# Patient Record
Sex: MALE | Race: WHITE | URBAN - METROPOLITAN AREA
[De-identification: names, ages, dates, MRNs, and addresses within clinical notes are randomized per-mention and may not be internally consistent; named-entity substitution may affect disease eponyms.]

---

## 2017-03-10 ENCOUNTER — TELEPHONE (OUTPATIENT)
Dept: FAMILY MEDICINE | Facility: OTHER | Age: 62
End: 2017-03-10

## 2017-03-10 NOTE — TELEPHONE ENCOUNTER
Summary:    Patient is due/failing the following:   BMP, LDL, BP CHECK and COLONOSCOPY    Action needed:   Patient needs office visit for follow up., Patient needs fasting lab only appointment and schedule a colonoscopy     Type of outreach:    Phone, left message for patient to call back.     Questions for provider review:    None                                                                                                                                    Anastacia Barajas       Chart routed to Care Team .      Panel Management Review      Patient has the following on his problem list:     Hypertension   Last three blood pressure readings:  BP Readings from Last 3 Encounters:   10/19/16 (!) 160/98   03/11/16 130/90   03/04/16 (!) 152/110     Blood pressure: FAILED    HTN Guidelines:  Age 18-59 BP range:  Less than 140/90  Age 60-85 with Diabetes:  Less than 140/90  Age 60-85 without Diabetes:  less than 150/90      Composite cancer screening  Chart review shows that this patient is due/due soon for the following Colonoscopy

## 2017-03-10 NOTE — TELEPHONE ENCOUNTER
Patient returning call. Informed of the message below. Patient will call back to schedule appointments. Patient goes somewhere else for his labs and would like a call back wanting to know what labs need to be done. Call patient at phone#[phone]

## 2017-04-05 ENCOUNTER — TELEPHONE (OUTPATIENT)
Dept: FAMILY MEDICINE | Facility: OTHER | Age: 62
End: 2017-04-05

## 2017-04-06 NOTE — TELEPHONE ENCOUNTER
Left message for patient to call back. Please see EM message below and help schedule an OV to follow up for repeat BP/labwork. Last OV 10/2016.  Janice Contreras, CMA

## 2017-04-07 NOTE — TELEPHONE ENCOUNTER
LM for patient to return our call, please give below message.   Olga Lidia Bardales CMA ([facility])

## 2017-04-10 NOTE — TELEPHONE ENCOUNTER
Patient would like to get his labs done at a different location, what would you like him to get done.  [facility] has no insurance [facility]

## 2017-04-10 NOTE — TELEPHONE ENCOUNTER
Orders for lipids and BMP are , we can extend them or if you want to reorder so the results go to you instead of [facility]. Also lipids were done in October do you want that done again or just the BMP? Omaira Clark, CMA

## 2017-04-10 NOTE — TELEPHONE ENCOUNTER
Informed patient bmp would be due and he stated that lab cor put in orders and will get them drawn there and will fax us the results.

## 2017-04-14 ENCOUNTER — TRANSFERRED RECORDS (OUTPATIENT)
Dept: HEALTH INFORMATION MANAGEMENT | Facility: CLINIC | Age: 62
End: 2017-04-14

## 2017-04-14 LAB
ALT SERPL-CCNC: 29 U/L (ref 5–50)
AST SERPL-CCNC: 25 U/L (ref 9–50)
CHOLEST SERPL-MCNC: 221 MG/DL
CREAT SERPL-MCNC: 1.04 MG/DL (ref 0.8–1.4)
GFR SERPL CREATININE-BSD FRML MDRD: 89 ML/MIN/1.73M2
GLUCOSE SERPL-MCNC: 120 MG/DL (ref 70–99)
HBA1C MFR BLD: 6 % (ref 0–5.7)
HDLC SERPL-MCNC: 57 MG/DL
LDLC SERPL CALC-MCNC: 145 MG/DL (ref 40–130)
NONHDLC SERPL-MCNC: 164 MG/DL
POTASSIUM SERPL-SCNC: 5 MEQ/L (ref 3.5–5.4)
TRIGL SERPL-MCNC: 78 MG/DL (ref 30–150)

## 2017-06-06 ENCOUNTER — OFFICE VISIT (OUTPATIENT)
Dept: FAMILY MEDICINE | Facility: OTHER | Age: 62
End: 2017-06-06

## 2017-06-06 VITALS
BODY MASS INDEX: 41.17 KG/M2 | HEIGHT: 72 IN | RESPIRATION RATE: 18 BRPM | TEMPERATURE: 97.8 F | WEIGHT: 304 LBS | DIASTOLIC BLOOD PRESSURE: 90 MMHG | HEART RATE: 62 BPM | SYSTOLIC BLOOD PRESSURE: 170 MMHG

## 2017-06-06 DIAGNOSIS — I10 ESSENTIAL HYPERTENSION: ICD-10-CM

## 2017-06-06 DIAGNOSIS — I10 BENIGN ESSENTIAL HYPERTENSION: ICD-10-CM

## 2017-06-06 PROCEDURE — 99213 OFFICE O/P EST LOW 20 MIN: CPT | Performed by: FAMILY MEDICINE

## 2017-06-06 RX ORDER — HYDROCHLOROTHIAZIDE 12.5 MG/1
25 CAPSULE ORAL EVERY MORNING
Qty: 90 CAPSULE | Refills: 1 | Status: SHIPPED | OUTPATIENT
Start: 2017-06-06

## 2017-06-06 RX ORDER — LOSARTAN POTASSIUM 100 MG/1
100 TABLET ORAL DAILY
Qty: 90 TABLET | Refills: 1 | Status: SHIPPED | OUTPATIENT
Start: 2017-06-06

## 2017-06-06 ASSESSMENT — PAIN SCALES - GENERAL: PAINLEVEL: NO PAIN (0)

## 2017-06-06 NOTE — NURSING NOTE
"Chief Complaint   Patient presents with     Hypertension     Health Maintenance       Initial BP (!) 170/100 (BP Location: Right arm, Patient Position: Chair, Cuff Size: Adult Large)  Pulse 62  Temp 97.8  F (36.6  C) (Oral)  Resp 18  Ht 5' 11.5\" (1.816 m)  Wt (!) 304 lb (137.9 kg)  BMI 41.81 kg/m2 Estimated body mass index is 41.81 kg/(m^2) as calculated from the following:    Height as of this encounter: 5' 11.5\" (1.816 m).    Weight as of this encounter: 304 lb (137.9 kg).  Medication Reconciliation: complete    "

## 2017-06-06 NOTE — ASSESSMENT & PLAN NOTE
Reviewed labs from outside.  Has been out of meds for 3 days   Restart meds  Return to pharmacy for blood pressure recheck in 1 week  Encouraged weight loss

## 2017-06-06 NOTE — MR AVS SNAPSHOT
"              After Visit Summary   6/6/2017    Oscar Gonsalez    MRN: [id number]           Patient Information     Date Of Birth          1955        Visit Information        Provider Department      6/6/2017 3:40 PM Annita Macdonald MD [facility]        Today's Diagnoses     Benign essential hypertension        Essential hypertension           Follow-ups after your visit        Follow-up notes from your care team     Return in about 6 months (around 12/6/2017).      Who to contact     If you have questions or need follow up information about today's clinic visit or your schedule please contact [facility] directly at [phone].  Normal or non-critical lab and imaging results will be communicated to you by MyChart, letter or phone within 4 business days after the clinic has received the results. If you do not hear from us within 7 days, please contact the clinic through Triparazzihart or phone. If you have a critical or abnormal lab result, we will notify you by phone as soon as possible.  Submit refill requests through Vantageous or call your pharmacy and they will forward the refill request to us. Please allow 3 business days for your refill to be completed.          Additional Information About Your Visit        MyChart Information     Vantageous gives you secure access to your electronic health record. If you see a primary care provider, you can also send messages to your care team and make appointments. If you have questions, please call your primary care clinic.  If you do not have a primary care provider, please call [phone] and they will assist you.        Care EveryWhere ID     This is your Care EveryWhere ID. This could be used by other organizations to access your [facility] medical records  [id number]        Your Vitals Were     Pulse Temperature Respirations Height BMI (Body Mass Index)       62 97.8  F (36.6  C) (Oral) 18 5' 11.5\" (1.816 m) 41.81 kg/m2        Blood " Pressure from Last 3 Encounters:   06/06/17 (!) 170/100   10/19/16 (!) 160/98   03/11/16 130/90    Weight from Last 3 Encounters:   06/06/17 (!) 304 lb (137.9 kg)   10/19/16 288 lb (130.6 kg)   03/11/16 266 lb (120.7 kg)              Today, you had the following     No orders found for display         Today's Medication Changes          These changes are accurate as of: 6/6/17  4:23 PM.  If you have any questions, ask your nurse or doctor.               These medicines have changed or have updated prescriptions.        Dose/Directions    hydrochlorothiazide 12.5 MG capsule   Commonly known as:  MICROZIDE   This may have changed:    - how much to take  - when to take this   Used for:  Benign essential hypertension   Changed by:  Annita Macdonald MD        Dose:  25 mg   Take 2 capsules (25 mg) by mouth every morning   Quantity:  90 capsule   Refills:  1            Where to get your medicines      These medications were sent to [place] [address]     Phone:  [phone]     hydrochlorothiazide 12.5 MG capsule    losartan 100 MG tablet                Primary Care Provider Office Phone # Fax #    Annita Macdonald [phone]       [place] [id number]        Thank you!     Thank you for choosing [facility]  for your care. Our goal is always to provide you with excellent care. Hearing back from our patients is one way we can continue to improve our services. Please take a few minutes to complete the written survey that you may receive in the mail after your visit with us. Thank you!             Your Updated Medication List - Protect others around you: Learn how to safely use, store and throw away your medicines at www.disposemymeds.org.          This list is accurate as of: 6/6/17  4:23 PM.  Always use your most recent med list.                   Brand Name  Dispense Instructions for use    aspirin 81 MG tablet     90 tablet    Take 1 tablet (81 mg) by mouth daily       fish oil-omega-3 fatty acids 1000 MG capsule      3000mg daily (liquid formalation)       hydrochlorothiazide 12.5 MG capsule    MICROZIDE    90 capsule    Take 2 capsules (25 mg) by mouth every morning       losartan 100 MG tablet    COZAAR    90 tablet    Take 1 tablet (100 mg) by mouth daily       multivitamin per tablet      1 TABLET DAILY

## 2017-06-15 ENCOUNTER — TELEPHONE (OUTPATIENT)
Dept: FAMILY MEDICINE | Facility: OTHER | Age: 62
End: 2017-06-15

## 2017-06-15 NOTE — TELEPHONE ENCOUNTER
Summary:    Patient is due/failing the following:   BMP, BP CHECK and COLONOSCOPY    Action needed:   Patient needs non-fasting lab only appointment, Patient needs nurse only appointment. and schedule a colonoscopy or complete a FIT test    Type of outreach:    Phone, spoke to patient.  patient will stop in next week for BP check     Questions for provider review:    None                                                                                                                                    Anastacia Barajas       Chart routed to Care Team .        Panel Management Review      Patient has the following on his problem list:     Hypertension   Last three blood pressure readings:  BP Readings from Last 3 Encounters:   06/06/17 170/90   10/19/16 (!) 160/98   03/11/16 130/90     Blood pressure: FAILED    HTN Guidelines:  Age 18-59 BP range:  Less than 140/90  Age 60-85 with Diabetes:  Less than 140/90  Age 60-85 without Diabetes:  less than 150/90      Composite cancer screening  Chart review shows that this patient is due/due soon for the following Colonoscopy

## 2017-06-21 ENCOUNTER — ALLIED HEALTH/NURSE VISIT (OUTPATIENT)
Dept: FAMILY MEDICINE | Facility: OTHER | Age: 62
End: 2017-06-21

## 2017-06-21 VITALS — HEART RATE: 72 BPM | SYSTOLIC BLOOD PRESSURE: 128 MMHG | DIASTOLIC BLOOD PRESSURE: 72 MMHG

## 2017-06-21 DIAGNOSIS — Z01.30 BLOOD PRESSURE CHECK: Primary | ICD-10-CM

## 2017-06-21 PROCEDURE — 99207 ZZC NO CHARGE NURSE ONLY: CPT

## 2017-06-21 NOTE — MR AVS SNAPSHOT
After Visit Summary   6/21/2017    Oscar Gonsalez    MRN: [id number]           Patient Information     Date Of Birth          1955        Visit Information        Provider Department      6/21/2017 2:30 PM TANESHA PLAZA TEAM B, [facility]        Today's Diagnoses     Blood pressure check    -  1       Follow-ups after your visit        Who to contact     If you have questions or need follow up information about today's clinic visit or your schedule please contact [facility] directly at [phone].  Normal or non-critical lab and imaging results will be communicated to you by Kleekhart, letter or phone within 4 business days after the clinic has received the results. If you do not hear from us within 7 days, please contact the clinic through Gear6t or phone. If you have a critical or abnormal lab result, we will notify you by phone as soon as possible.  Submit refill requests through Juntines or call your pharmacy and they will forward the refill request to us. Please allow 3 business days for your refill to be completed.          Additional Information About Your Visit        MyChart Information     Juntines gives you secure access to your electronic health record. If you see a primary care provider, you can also send messages to your care team and make appointments. If you have questions, please call your primary care clinic.  If you do not have a primary care provider, please call [phone] and they will assist you.        Care EveryWhere ID     This is your Care EveryWhere ID. This could be used by other organizations to access your [facility] medical records  [id number]        Your Vitals Were     Pulse                   72            Blood Pressure from Last 3 Encounters:   06/21/17 128/72   06/06/17 170/90   10/19/16 (!) 160/98    Weight from Last 3 Encounters:   06/06/17 (!) 304 lb (137.9 kg)   10/19/16 288 lb (130.6 kg)   03/11/16 266 lb (120.7 kg)               Today, you had the following     No orders found for display       Primary Care Provider Office Phone # Fax #    Annita Macdonald [phone]       [address] [address]        Equal Access to Services     SAM CASTILLO : Hadvandana blake Sonataleeali, waaxda luqadaha, qaybta kaalmada adeegyada, francisca gregg. So [facility] [phone].    ATENCIÓN: Si habla español, tiene a farmer disposición servicios gratuitos de asistencia lingüística. Llame al [phone].    We comply with applicable federal civil rights laws and [place] laws. We do not discriminate on the basis of race, color, national origin, age, disability sex, sexual orientation or gender identity.            Thank you!     Thank you for choosing [facility]  for your care. Our goal is always to provide you with excellent care. Hearing back from our patients is one way we can continue to improve our services. Please take a few minutes to complete the written survey that you may receive in the mail after your visit with us. Thank you!             Your Updated Medication List - Protect others around you: Learn how to safely use, store and throw away your medicines at www.disposemymeds.org.          This list is accurate as of: 6/21/17  2:42 PM.  Always use your most recent med list.                   Brand Name Dispense Instructions for use Diagnosis    aspirin 81 MG tablet     90 tablet    Take 1 tablet (81 mg) by mouth daily    CAD (coronary artery disease)       fish oil-omega-3 fatty acids 1000 MG capsule      3000mg daily (liquid formalation)        hydrochlorothiazide 12.5 MG capsule    MICROZIDE    90 capsule    Take 2 capsules (25 mg) by mouth every morning    Benign essential hypertension       losartan 100 MG tablet    COZAAR    90 tablet    Take 1 tablet (100 mg) by mouth daily    Benign essential hypertension       multivitamin per  tablet      1 TABLET DAILY

## 2017-06-21 NOTE — NURSING NOTE
Oscar Gonsalez is a 61 year old male who comes in today for a Blood Pressure check because of ongoing blood pressure monitoring.    *Document pulse and BP  *Use new set of vitals button for multiple readings.  *Use extended vitals for orthostatic    Vitals as recorded, a large cuff was used.    Patient is taking medication as prescribed  Patient is tolerating medications well.  Patient is monitoring Blood Pressure at home.  Average readings if yes are 135-140/84    Current complaints: none    Disposition: results routed to MD/AP and patient reminded to call as needed  Omaira Clark, CMA

## 2017-09-06 ENCOUNTER — TELEPHONE (OUTPATIENT)
Dept: FAMILY MEDICINE | Facility: OTHER | Age: 62
End: 2017-09-06

## 2017-09-06 DIAGNOSIS — E78.5 HYPERLIPIDEMIA LDL GOAL <100: Primary | ICD-10-CM

## 2017-09-06 NOTE — TELEPHONE ENCOUNTER
Summary:    Patient is due/failing the following:   BMP, COLONOSCOPY and LDL    Action needed:   Patient needs fasting lab only appointment and schedule a colonoscopy or complete a FIT test     Type of outreach:    None, routed to provider for review.    Questions for provider review:    Patient is on the IVD fail list due to LDL being >40 and not prescribed a STATIN. If patient is intentionally not prescribed a STATIN please update medication list with STATIN intentionally not prescribed.                                                                                                                                     Anastacai Barajas       Chart routed to Provider .        Panel Management Review      Patient has the following on his problem list:       IVD   ASA: Passed    Last LDL:    Lab Results   Component Value Date    CHOL 221 04/14/2017     Lab Results   Component Value Date    HDL 57 04/14/2017     Lab Results   Component Value Date     04/14/2017     Lab Results   Component Value Date    TRIG 78 04/14/2017        Lab Results   Component Value Date    CHOLHDLRATIO 3.7 06/25/2013        Is the patient on a Statin? NO   Is the patient on Aspirin? YES                  Medications     Salicylates    aspirin 81 MG tablet          Last three blood pressure readings:  BP Readings from Last 3 Encounters:   06/21/17 128/72   06/06/17 170/90   10/19/16 (!) 160/98        Tobacco History:     History   Smoking Status     Never Smoker   Smokeless Tobacco     Never Used           Composite cancer screening  Chart review shows that this patient is due/due soon for the following Colonoscopy

## 2018-01-11 ENCOUNTER — TELEPHONE (OUTPATIENT)
Dept: FAMILY MEDICINE | Facility: OTHER | Age: 63
End: 2018-01-11

## 2018-01-11 NOTE — TELEPHONE ENCOUNTER
1/11/2018    Patient does not have insurance and will not be doing Colonoscopy at the moment.          Outreach ,  Mauricio Botello

## 2018-09-10 ENCOUNTER — TRANSFERRED RECORDS (OUTPATIENT)
Dept: HEALTH INFORMATION MANAGEMENT | Facility: CLINIC | Age: 63
End: 2018-09-10

## 2019-10-29 ENCOUNTER — TRANSFERRED RECORDS (OUTPATIENT)
Dept: HEALTH INFORMATION MANAGEMENT | Facility: CLINIC | Age: 64
End: 2019-10-29

## 2019-11-06 ENCOUNTER — MEDICAL CORRESPONDENCE (OUTPATIENT)
Dept: HEALTH INFORMATION MANAGEMENT | Facility: CLINIC | Age: 64
End: 2019-11-06

## 2019-11-06 ENCOUNTER — TELEPHONE (OUTPATIENT)
Dept: FAMILY MEDICINE | Facility: OTHER | Age: 64
End: 2019-11-06

## 2019-11-06 NOTE — TELEPHONE ENCOUNTER
Reason for Call:  Form, our goal is to have forms completed with 72 hours, however, some forms may require a visit or additional information.    Type of letter, form or note:  medical    Who is the form from?: Juan Hearing (if other please explain)    Where did the form come from: form was faxed in    What clinic location was the form placed at?: [facility] - [phone]    Where the form was placed:  Box/Folder    What number is listed as a contact on the form?: [phone]       Additional comments: sign fax back    Call taken on 11/6/2019 at 2:01 PM by Deedee Whitten

## 2020-02-23 ENCOUNTER — HEALTH MAINTENANCE LETTER (OUTPATIENT)
Age: 65
End: 2020-02-23

## 2020-12-06 ENCOUNTER — HEALTH MAINTENANCE LETTER (OUTPATIENT)
Age: 65
End: 2020-12-06

## 2021-09-26 ENCOUNTER — HEALTH MAINTENANCE LETTER (OUTPATIENT)
Age: 66
End: 2021-09-26

## 2022-01-15 ENCOUNTER — HEALTH MAINTENANCE LETTER (OUTPATIENT)
Age: 67
End: 2022-01-15

## 2023-02-01 ENCOUNTER — OFFICE VISIT (OUTPATIENT)
Dept: INTERNAL MEDICINE CLINIC | Facility: CLINIC | Age: 68
End: 2023-02-01
Payer: MEDICARE

## 2023-02-01 VITALS
HEIGHT: 72 IN | DIASTOLIC BLOOD PRESSURE: 76 MMHG | BODY MASS INDEX: 41.55 KG/M2 | SYSTOLIC BLOOD PRESSURE: 130 MMHG | WEIGHT: 306.8 LBS

## 2023-02-01 DIAGNOSIS — Z78.9 STATIN INTOLERANCE: ICD-10-CM

## 2023-02-01 DIAGNOSIS — Z12.11 COLON CANCER SCREENING: ICD-10-CM

## 2023-02-01 DIAGNOSIS — Z12.5 PROSTATE CANCER SCREENING: ICD-10-CM

## 2023-02-01 DIAGNOSIS — E11.9 TYPE 2 DIABETES MELLITUS WITHOUT COMPLICATION, WITHOUT LONG-TERM CURRENT USE OF INSULIN (HCC): ICD-10-CM

## 2023-02-01 DIAGNOSIS — I25.10 CORONARY ARTERY DISEASE INVOLVING NATIVE CORONARY ARTERY OF NATIVE HEART WITHOUT ANGINA PECTORIS: Primary | ICD-10-CM

## 2023-02-01 DIAGNOSIS — E66.01 MORBID OBESITY (HCC): ICD-10-CM

## 2023-02-01 DIAGNOSIS — E11.69 HYPERLIPIDEMIA ASSOCIATED WITH TYPE 2 DIABETES MELLITUS (HCC): ICD-10-CM

## 2023-02-01 DIAGNOSIS — I10 HYPERTENSION, ESSENTIAL: ICD-10-CM

## 2023-02-01 DIAGNOSIS — E78.5 HYPERLIPIDEMIA ASSOCIATED WITH TYPE 2 DIABETES MELLITUS (HCC): ICD-10-CM

## 2023-02-01 DIAGNOSIS — E08.65 DIABETES MELLITUS DUE TO UNDERLYING CONDITION WITH HYPERGLYCEMIA, WITHOUT LONG-TERM CURRENT USE OF INSULIN (HCC): ICD-10-CM

## 2023-02-01 DIAGNOSIS — Z23 ENCOUNTER FOR IMMUNIZATION: ICD-10-CM

## 2023-02-01 LAB
ALBUMIN SERPL-MCNC: 3.7 G/DL (ref 3.2–4.6)
ALBUMIN/GLOB SERPL: 1 (ref 0.4–1.6)
ALP SERPL-CCNC: 76 U/L (ref 50–136)
ALT SERPL-CCNC: 36 U/L (ref 12–65)
ANION GAP SERPL CALC-SCNC: 7 MMOL/L (ref 2–11)
AST SERPL-CCNC: 16 U/L (ref 15–37)
BASOPHILS # BLD: 0.1 K/UL (ref 0–0.2)
BASOPHILS NFR BLD: 1 % (ref 0–2)
BILIRUB SERPL-MCNC: 0.4 MG/DL (ref 0.2–1.1)
BUN SERPL-MCNC: 28 MG/DL (ref 8–23)
CALCIUM SERPL-MCNC: 9 MG/DL (ref 8.3–10.4)
CHLORIDE SERPL-SCNC: 103 MMOL/L (ref 101–110)
CHOLEST SERPL-MCNC: 237 MG/DL
CO2 SERPL-SCNC: 28 MMOL/L (ref 21–32)
CREAT SERPL-MCNC: 1.6 MG/DL (ref 0.8–1.5)
DIFFERENTIAL METHOD BLD: NORMAL
EOSINOPHIL # BLD: 0.3 K/UL (ref 0–0.8)
EOSINOPHIL NFR BLD: 3 % (ref 0.5–7.8)
ERYTHROCYTE [DISTWIDTH] IN BLOOD BY AUTOMATED COUNT: 12.6 % (ref 11.9–14.6)
EST. AVERAGE GLUCOSE BLD GHB EST-MCNC: 220 MG/DL
GLOBULIN SER CALC-MCNC: 3.7 G/DL (ref 2.8–4.5)
GLUCOSE SERPL-MCNC: 267 MG/DL (ref 65–100)
HBA1C MFR BLD: 9.3 % (ref 4.8–5.6)
HCT VFR BLD AUTO: 46.1 % (ref 41.1–50.3)
HDLC SERPL-MCNC: 42 MG/DL (ref 40–60)
HDLC SERPL: 5.6
HGB BLD-MCNC: 15.2 G/DL (ref 13.6–17.2)
IMM GRANULOCYTES # BLD AUTO: 0 K/UL (ref 0–0.5)
IMM GRANULOCYTES NFR BLD AUTO: 0 % (ref 0–5)
LDLC SERPL CALC-MCNC: 157.2 MG/DL
LYMPHOCYTES # BLD: 2.1 K/UL (ref 0.5–4.6)
LYMPHOCYTES NFR BLD: 25 % (ref 13–44)
MCH RBC QN AUTO: 29.3 PG (ref 26.1–32.9)
MCHC RBC AUTO-ENTMCNC: 33 G/DL (ref 31.4–35)
MCV RBC AUTO: 88.8 FL (ref 82–102)
MONOCYTES # BLD: 0.8 K/UL (ref 0.1–1.3)
MONOCYTES NFR BLD: 9 % (ref 4–12)
NEUTS SEG # BLD: 5.4 K/UL (ref 1.7–8.2)
NEUTS SEG NFR BLD: 62 % (ref 43–78)
NRBC # BLD: 0 K/UL (ref 0–0.2)
PLATELET # BLD AUTO: 216 K/UL (ref 150–450)
PMV BLD AUTO: 10.6 FL (ref 9.4–12.3)
POTASSIUM SERPL-SCNC: 4.3 MMOL/L (ref 3.5–5.1)
PROT SERPL-MCNC: 7.4 G/DL (ref 6.3–8.2)
PSA SERPL-MCNC: 2.4 NG/ML
RBC # BLD AUTO: 5.19 M/UL (ref 4.23–5.6)
SODIUM SERPL-SCNC: 138 MMOL/L (ref 133–143)
TRIGL SERPL-MCNC: 189 MG/DL (ref 35–150)
TSH, 3RD GENERATION: 2.9 UIU/ML (ref 0.36–3.74)
VLDLC SERPL CALC-MCNC: 37.8 MG/DL (ref 6–23)
WBC # BLD AUTO: 8.7 K/UL (ref 4.3–11.1)

## 2023-02-01 PROCEDURE — 3078F DIAST BP <80 MM HG: CPT | Performed by: INTERNAL MEDICINE

## 2023-02-01 PROCEDURE — 3046F HEMOGLOBIN A1C LEVEL >9.0%: CPT | Performed by: INTERNAL MEDICINE

## 2023-02-01 PROCEDURE — G8417 CALC BMI ABV UP PARAM F/U: HCPCS | Performed by: INTERNAL MEDICINE

## 2023-02-01 PROCEDURE — 3075F SYST BP GE 130 - 139MM HG: CPT | Performed by: INTERNAL MEDICINE

## 2023-02-01 PROCEDURE — 1123F ACP DISCUSS/DSCN MKR DOCD: CPT | Performed by: INTERNAL MEDICINE

## 2023-02-01 PROCEDURE — G8484 FLU IMMUNIZE NO ADMIN: HCPCS | Performed by: INTERNAL MEDICINE

## 2023-02-01 PROCEDURE — 3017F COLORECTAL CA SCREEN DOC REV: CPT | Performed by: INTERNAL MEDICINE

## 2023-02-01 PROCEDURE — 2022F DILAT RTA XM EVC RTNOPTHY: CPT | Performed by: INTERNAL MEDICINE

## 2023-02-01 PROCEDURE — 99204 OFFICE O/P NEW MOD 45 MIN: CPT | Performed by: INTERNAL MEDICINE

## 2023-02-01 PROCEDURE — 1036F TOBACCO NON-USER: CPT | Performed by: INTERNAL MEDICINE

## 2023-02-01 PROCEDURE — G8428 CUR MEDS NOT DOCUMENT: HCPCS | Performed by: INTERNAL MEDICINE

## 2023-02-01 RX ORDER — CLOPIDOGREL BISULFATE 75 MG/1
TABLET ORAL
COMMUNITY
Start: 2023-01-09

## 2023-02-01 RX ORDER — NITROGLYCERIN 0.4 MG/1
TABLET SUBLINGUAL
COMMUNITY
Start: 2023-01-09

## 2023-02-01 RX ORDER — AMLODIPINE BESYLATE 10 MG/1
TABLET ORAL
COMMUNITY
Start: 2022-12-26

## 2023-02-01 RX ORDER — CARVEDILOL 3.12 MG/1
3.12 TABLET ORAL DAILY
COMMUNITY

## 2023-02-01 RX ORDER — EZETIMIBE 10 MG/1
10 TABLET ORAL NIGHTLY
COMMUNITY

## 2023-02-01 RX ORDER — TORSEMIDE 10 MG/1
10 TABLET ORAL DAILY
COMMUNITY

## 2023-02-01 ASSESSMENT — ENCOUNTER SYMPTOMS
STRIDOR: 0
EYE PAIN: 0
CHOKING: 0
RECTAL PAIN: 0
VOICE CHANGE: 0

## 2023-02-01 ASSESSMENT — PATIENT HEALTH QUESTIONNAIRE - PHQ9
SUM OF ALL RESPONSES TO PHQ QUESTIONS 1-9: 0
SUM OF ALL RESPONSES TO PHQ QUESTIONS 1-9: 0
2. FEELING DOWN, DEPRESSED OR HOPELESS: 0
SUM OF ALL RESPONSES TO PHQ QUESTIONS 1-9: 0
1. LITTLE INTEREST OR PLEASURE IN DOING THINGS: 0
SUM OF ALL RESPONSES TO PHQ9 QUESTIONS 1 & 2: 0
SUM OF ALL RESPONSES TO PHQ QUESTIONS 1-9: 0

## 2023-02-01 NOTE — PROGRESS NOTES
NEW PATIENT VISIT    Subjective:    Mr. Mc Soria is a 79 y.o., male,   Chief Complaint   Patient presents with    Establish Care    Referral - General     Cardiologist        HPI:    Mr. Mc Soria is a 79 y.o., male who presents today for a new patient appointment. The patient recently moved to [facility] from [place]. He cannot recall the names of his previous physicians and there are no old records in [address] [place] [address]. The patient has hypertension. The patient has been on an attempted low sodium diet and has been trying to exercise and maintain a healthy weight. The patient reports good compliance with the blood pressure medications. The patient has coronary artery disease. The patient has been attempting to follow a heart healthy diet and exercise. The patient denies chest pain, shortness of breath, dyspnea on exertion, or PND. The patient has diabetes mellitus. The patient denies any symptoms of hypo or hyperglycemia. The patient has been attempting to be compliant with an ADA diet and an exercise program.     The patient has hyperlipidemia. The patient has been following a low cholesterol diet. He has been statin intolerant. The following portions of the patient's history were reviewed and updated as appropriate:      Past Medical History:   Diagnosis Date    Coronary artery disease     S/P MI / stents.     Diabetes mellitus, type 2 ([address]     Hyperlipidemia associated with type 2 diabetes mellitus (HCC)     Hypertension, essential     Morbid obesity (HCC)     Statin intolerance        Past Surgical History:   Procedure Laterality Date    SHOULDER SURGERY Left     labral tear / rotator cuff       Family History   Problem Relation Age of Onset    Cervical Cancer Mother     Heart Attack Father        Social History     Socioeconomic History    Marital status:      Spouse name: Not on file    Number of children: 4    Years of education: Not on file    Highest education level: Not on file   Occupational History     Comment: self employed biofeedback   Tobacco Use    Smoking status: Never    Smokeless tobacco: Never   Substance and Sexual Activity    Alcohol use: Not Currently    Drug use: Never    Sexual activity: Not on file   Other Topics Concern    Not on file   Social History Narrative    Not on file     Social Determinants of Health     Financial Resource Strain: Not on file   Food Insecurity: Not on file   Transportation Needs: Not on file   Physical Activity: Not on file   Stress: Not on file   Social Connections: Not on file   Intimate Partner Violence: Not on file   Housing Stability: Not on file       Current Outpatient Medications   Medication Sig Dispense Refill    amLODIPine (NORVASC) 10 MG tablet TAKE 1 TABLET BY MOUTH EVERY DAY      clopidogrel (PLAVIX) 75 MG tablet TAKE 1 TABLET BY MOUTH ONCE DAILY. nitroGLYCERIN (NITROSTAT) 0.4 MG SL tablet       torsemide (DEMADEX) 10 MG tablet Take 10 mg by mouth daily      carvedilol (COREG) 3.125 MG tablet Take 3.125 mg by mouth daily      ezetimibe (ZETIA) 10 MG tablet Take 10 mg by mouth at bedtime       No current facility-administered medications for this visit. Allergies as of 02/01/2023 - never reviewed   Allergen Reaction Noted    Dye [iodides] Other (See Comments) 02/01/2023    Statins Other (See Comments) 02/01/2023       Review of Systems   Constitutional:  Negative for activity change and appetite change. HENT:  Negative for drooling and voice change. Eyes:  Negative for pain. Respiratory:  Negative for choking and stridor. Gastrointestinal:  Negative for rectal pain. Endocrine: Negative for polydipsia and polyphagia. Genitourinary:  Negative for enuresis and penile pain. Musculoskeletal:  Negative for gait problem and neck stiffness. Skin:  Negative for pallor. Allergic/Immunologic: Negative for immunocompromised state. Neurological:  Negative for facial asymmetry and speech difficulty. Hematological:  Does not bruise/bleed easily. Psychiatric/Behavioral:  Negative for self-injury. The patient is not hyperactive. Patient Care Team:  Amos Skinner MD as PCP - General (Internal Medicine)    Objective:    /76 (Site: Left Upper Arm, Position: Sitting)   Ht 6' (1.829 m)   Wt (!) 306 lb 12.8 oz (139.2 kg)   BMI 41.61 kg/m²     Physical Exam  Vitals reviewed. Constitutional:       General: He is not in acute distress. Appearance: Normal appearance. He is not toxic-appearing. HENT:      Head: Normocephalic and atraumatic. Right Ear: Tympanic membrane, ear canal and external ear normal.      Left Ear: Tympanic membrane, ear canal and external ear normal.      Nose: Nose normal.      Mouth/Throat:      Mouth: Mucous membranes are moist.      Pharynx: Oropharynx is clear. Eyes:      General: No scleral icterus. Extraocular Movements: Extraocular movements intact. Conjunctiva/sclera: Conjunctivae normal.      Pupils: Pupils are equal, round, and reactive to light. Cardiovascular:      Rate and Rhythm: Normal rate and regular rhythm. Pulses: Normal pulses. Heart sounds: Normal heart sounds. Pulmonary:      Breath sounds: Normal breath sounds. Abdominal:      General: Abdomen is flat. Bowel sounds are normal.      Palpations: Abdomen is soft. There is no mass. Tenderness: There is no guarding or rebound. Musculoskeletal:         General: Normal range of motion. Cervical back: Normal range of motion and neck supple. Skin:     General: Skin is warm and dry. Coloration: Skin is not jaundiced. Neurological:      Mental Status: He is alert and oriented to person, place, and time. Mental status is at baseline.       Comments: Diabetic foot exam:   Left Foot:   Visual Exam: normal   Pulse DP: 2+ (normal)   Filament test: normal sensation     Right Foot:   Visual Exam: normal   Pulse DP: 2+ (normal)   Filament test: normal sensation Psychiatric:         Behavior: Behavior normal.         Thought Content: Thought content normal.            No results found for any previous visit. Assessent & Plan:        1. Coronary artery disease involving native coronary artery of native heart without angina pectoris  Overview:  S/P MI / stents. Await old records. Continue antiplatelet therapy. Statin intolerant. Refer to cardiology. Orders:  -     [facility] [place]  2. Colon cancer screening  Overview:  2022 cologuard (in [place]) negative. Never had a colonoscopy. Repeat Cologuard in three years. 3. Prostate cancer screening  Overview:  Check PSA. Orders:  -     PSA Screening; Future  4. Encounter for immunization  Overview:  Vaccinations were reviewed and discussed. Patient declines all vaccinations. 5. Type 2 diabetes mellitus without complication, without long-term current use of insulin ([facility]  Overview:  Update labs to assess response to attempted diet and exercise therapy. Orders:  -     Hemoglobin A1C; Future  -     Microalbumin / Creatinine Urine Ratio; Future  6. Hyperlipidemia associated with type 2 diabetes mellitus ([facility] [address]  Overview:  Statin intolerant. Continue Zetia. Orders:  -     Lipid Panel; Future  7. Hypertension, essential  Overview:  Well controlled on current coreg and norvasc. Orders:  -     CBC with Auto Differential; Future  -     Comprehensive Metabolic Panel; Future  8. Morbid obesity ([facility] [address] [facility]  Overview:  Reviewed the patient's BMI. Discussed the need to lose weight in order to avoid many preventable illnesses. Discussed diet, exercise, and weight loss strategies. Orders:  -     TSH; Future  9. Statin intolerance  10. Diabetes mellitus due to underlying condition with hyperglycemia, without long-term current use of insulin (HCC)   -     TSH;  Future      The patient and/or patient representative voiced understanding and agreement with the current diagnoses, recommendations, and possible side effects.    Return in about 1 month (around 3/1/2023) for follow up of chronic medical problems, review labs.

## 2023-02-03 ENCOUNTER — TELEPHONE (OUTPATIENT)
Dept: CARDIOLOGY CLINIC | Age: 68
End: 2023-02-03

## 2023-03-03 PROBLEM — Z12.5 PROSTATE CANCER SCREENING: Status: RESOLVED | Noted: 2023-02-01 | Resolved: 2023-03-03

## 2023-03-03 PROBLEM — Z12.11 COLON CANCER SCREENING: Status: RESOLVED | Noted: 2023-02-01 | Resolved: 2023-03-03

## 2023-03-06 NOTE — PROGRESS NOTES
[facility] CARDIOLOGY History & Physical                 Reason for Visit: Stable ischemic heart disease    Subjective:     Patient is a 79 y.o. male with a PMH of CAD status post PCI, asymptomatic right ICA stenosis, hypertension, hyperlipidemia, and statin intolerance who presents as a referral for stable ischemic heart disease. The patient had an [facility] in December 2021 in the setting of an NSTEMI in [place] that was noted to demonstrate a 70% ostial LAD stenosis, patent stents of the remainder of the LAD, patent stents of the LCx, \"mild ISR\" of the RCA and \"severe recurrent ISR\" of the distal RCA into the PDA. According to documentation, the case was discussed with CTS due to failure to intervene on the RCA; however, the patient declined CABG. The patient left AMA, according to documentation. The patient reports that he has attempted several statins with YOLETTE in the past.  He is not interested in a PCSK9 inhibitor because he does not want to inject himself. The patient denies angina. He does report chronic dyspnea on exertion walking up hills for the last 2 to 3 years. Past Medical History:   Diagnosis Date    Coronary artery disease     S/P MI / stents.     Diabetes mellitus, type 2 ([address]     Hyperlipidemia associated with type 2 diabetes mellitus ([address]     Hypertension, essential     Morbid obesity (HCC)     Statin intolerance       Past Surgical History:   Procedure Laterality Date    SHOULDER SURGERY Left     labral tear / rotator cuff      Family History   Problem Relation Age of Onset    Cervical Cancer Mother     Heart Attack Father       Social History     Tobacco Use    Smoking status: Never    Smokeless tobacco: Never   Substance Use Topics    Alcohol use: Not Currently      Allergies   Allergen Reactions    Dye [Iodides] Other (See Comments)     IV contrast nephropathy    Statins Other (See Comments)     Muscle aches          ROS:  No obvious pertinent positives on review of systems except for what was outlined above. Objective:       /78   Pulse 54   Ht 6' (1.829 m)   Wt (!) 304 lb 3.2 oz (138 kg)   BMI 41.26 kg/m²     BP Readings from Last 3 Encounters:   03/08/23 136/78   02/01/23 130/76       Wt Readings from Last 3 Encounters:   03/08/23 (!) 304 lb 3.2 oz (138 kg)   02/01/23 (!) 306 lb 12.8 oz (139.2 kg)       General/Constitutional:   Alert and oriented x 3, no acute distress  HEENT:   normocephalic, atraumatic, moist mucous membranes  Neck:   No JVD or carotid bruits bilaterally  Cardiovascular:   regular rate and rhythm, no rub/gallop appreciated  Pulmonary:   clear to auscultation bilaterally, no respiratory distress  Abdomen:   soft, non-tender, non-distended  Ext:   No sig LE edema bilaterally  Skin:  warm and dry, no obvious rashes seen  Neuro:   no obvious sensory or motor deficits  Psychiatric:   normal mood and affect      ECG:   Sinus bradycardia  Nonspecific ST and/or T wave abnormalities  Heart rate 54 bpm      Data Review:   Lab Results   Component Value Date    CHOL 237 (H) 02/01/2023     Lab Results   Component Value Date    TRIG 189 (H) 02/01/2023     Lab Results   Component Value Date    HDL 42 02/01/2023     Lab Results   Component Value Date    LDLCALC 157.2 (H) 02/01/2023     Lab Results   Component Value Date    LABVLDL 37.8 (H) 02/01/2023     Lab Results   Component Value Date    CHOLHDLRATIO 5.6 02/01/2023        Lab Results   Component Value Date/Time     02/01/2023 09:46 AM    K 4.3 02/01/2023 09:46 AM     02/01/2023 09:46 AM    CO2 28 02/01/2023 09:46 AM    BUN 28 02/01/2023 09:46 AM    CREATININE 1.60 02/01/2023 09:46 AM    GLUCOSE 267 02/01/2023 09:46 AM    CALCIUM 9.0 02/01/2023 09:46 AM         Lab Results   Component Value Date    ALT 36 02/01/2023    AST 16 02/01/2023        Assessment/Plan:   1. CAD in native artery  - Continue with Plavix and Coreg  - Patient not interested in PCSK9 inhibitors  - History of statin intolerance    2. Hypertension, unspecified type  - Well-controlled  - Continue with amlodipine and Coreg  - Currently on losartan    3. Chronic dyspnea  - [facility] completed in December 2021 in the setting of an NSTEMI where a 70% ostial LAD stenosis, patent stents of the remainder of the LAD, patent stents of the LCx, \"mild ISR\" of the RCA and \"severe recurrent ISR\" of the distal RCA into the PDA  - According to documentation, at the time of cath, the case was discussed with CTS due to failure to intervene on the RCA; however, the patient declined CABG (the patient left AMA per documentation)  - Pertinent negatives include angina  - Obtain an echocardiogram     4. Asymptomatic stenosis of right carotid artery  - Endovascular or surgery intervention are neither grade I nor IIa recommendations for asymptomatic carotid artery stenosis; thus, the usefulness/effectiveness of intervention is unknown/unclear/uncertain or not well established (however, there is a strong recommendation for intervention for >70% stenosis on US only in symptomatic patients with TIA or CVA; the patient does not fit into this category)   - Continue with Plavix  - See \"CAD in native artery\" regarding antilipid therapy    5. Hyperlipidemia, unspecified hyperlipidemia type  - See \"CAD in native artery\" regarding antilipid therapy    6.  Morbid obesity with BMI of 40.0-44.9, adult ([address]  - Educated on Mediterranean diet and exercise    F/U: 6 months    Darlene Osuna MD

## 2023-03-08 ENCOUNTER — INITIAL CONSULT (OUTPATIENT)
Dept: CARDIOLOGY CLINIC | Age: 68
End: 2023-03-08
Payer: MEDICARE

## 2023-03-08 VITALS
WEIGHT: 304.2 LBS | DIASTOLIC BLOOD PRESSURE: 78 MMHG | HEART RATE: 54 BPM | SYSTOLIC BLOOD PRESSURE: 136 MMHG | HEIGHT: 72 IN | BODY MASS INDEX: 41.2 KG/M2

## 2023-03-08 DIAGNOSIS — I65.21 ASYMPTOMATIC STENOSIS OF RIGHT CAROTID ARTERY: ICD-10-CM

## 2023-03-08 DIAGNOSIS — I25.10 CAD IN NATIVE ARTERY: Primary | ICD-10-CM

## 2023-03-08 DIAGNOSIS — E66.01 MORBID OBESITY WITH BMI OF 40.0-44.9, ADULT (HCC): ICD-10-CM

## 2023-03-08 DIAGNOSIS — E78.5 HYPERLIPIDEMIA, UNSPECIFIED HYPERLIPIDEMIA TYPE: ICD-10-CM

## 2023-03-08 DIAGNOSIS — R06.09 CHRONIC DYSPNEA: ICD-10-CM

## 2023-03-08 DIAGNOSIS — I10 HYPERTENSION, UNSPECIFIED TYPE: ICD-10-CM

## 2023-03-08 PROCEDURE — 3075F SYST BP GE 130 - 139MM HG: CPT | Performed by: INTERNAL MEDICINE

## 2023-03-08 PROCEDURE — 1036F TOBACCO NON-USER: CPT | Performed by: INTERNAL MEDICINE

## 2023-03-08 PROCEDURE — G8427 DOCREV CUR MEDS BY ELIG CLIN: HCPCS | Performed by: INTERNAL MEDICINE

## 2023-03-08 PROCEDURE — G8417 CALC BMI ABV UP PARAM F/U: HCPCS | Performed by: INTERNAL MEDICINE

## 2023-03-08 PROCEDURE — 93000 ELECTROCARDIOGRAM COMPLETE: CPT | Performed by: INTERNAL MEDICINE

## 2023-03-08 PROCEDURE — G8484 FLU IMMUNIZE NO ADMIN: HCPCS | Performed by: INTERNAL MEDICINE

## 2023-03-08 PROCEDURE — 99204 OFFICE O/P NEW MOD 45 MIN: CPT | Performed by: INTERNAL MEDICINE

## 2023-03-08 PROCEDURE — 3078F DIAST BP <80 MM HG: CPT | Performed by: INTERNAL MEDICINE

## 2023-03-08 PROCEDURE — 3017F COLORECTAL CA SCREEN DOC REV: CPT | Performed by: INTERNAL MEDICINE

## 2023-03-08 PROCEDURE — 1123F ACP DISCUSS/DSCN MKR DOCD: CPT | Performed by: INTERNAL MEDICINE

## 2023-03-08 RX ORDER — LOSARTAN POTASSIUM 100 MG/1
100 TABLET ORAL DAILY
COMMUNITY

## 2023-03-14 RX ORDER — NITROGLYCERIN 0.4 MG/1
0.4 TABLET SUBLINGUAL EVERY 5 MIN PRN
Qty: 25 TABLET | Refills: 11 | Status: SHIPPED | OUTPATIENT
Start: 2023-03-14

## 2023-03-14 NOTE — TELEPHONE ENCOUNTER
Med  refill pended as below for approval.Initial consult 3/8 w/ and med already on chart. Pt.moved recently to the area. Requested Prescriptions     Pending Prescriptions Disp Refills    nitroGLYCERIN (NITROSTAT) 0.4 MG SL tablet 25 tablet 11     Sig: Place 1 tablet under the tongue every 5 minutes as needed for Chest pain (take one tablet by mouth under tongue every 5 min x 3 as neede for chest pain. If no relief by 3rd dose call 911.)

## 2023-03-14 NOTE — TELEPHONE ENCOUNTER
MEDICATION REFILL REQUEST      Name of Medication:  Nitroglycerin  Dose:  0.4 mg  Frequency:  ?  Quantity:  ?  Days' supply:  ?       Pharmacy Name/Location:  [facility] pharmacy

## 2023-03-21 RX ORDER — NITROGLYCERIN 0.4 MG/1
0.4 TABLET SUBLINGUAL EVERY 5 MIN PRN
Qty: 25 TABLET | Refills: 11 | Status: SHIPPED | OUTPATIENT
Start: 2023-03-21

## 2023-03-21 NOTE — TELEPHONE ENCOUNTER
Requested Prescriptions     Pending Prescriptions Disp Refills    nitroGLYCERIN (NITROSTAT) 0.4 MG SL tablet 25 tablet 11     Sig: Place 1 tablet under the tongue every 5 minutes as needed for Chest pain (take one tablet by mouth under tongue every 5 min x 3 as neede for chest pain. If no relief by 3rd dose call 911.)     Pended NTG refill sent to Dr. Darrell Fernández for approval.

## 2023-03-21 NOTE — TELEPHONE ENCOUNTER
Patient request refill for nitroglycerin to be sent to mail order [facility] through [facility] [employer].   Has never rec'd from previous request.

## 2023-03-23 ENCOUNTER — TELEPHONE (OUTPATIENT)
Dept: CARDIOLOGY CLINIC | Age: 68
End: 2023-03-23

## 2023-03-23 NOTE — TELEPHONE ENCOUNTER
Left message on voicemail with echo results and Dr. Filippo Ro response. In message, advised patient to call with any questions or concerns.

## 2023-03-23 NOTE — TELEPHONE ENCOUNTER
----- Message from Katelynn Enriquez MD sent at 3/22/2023  5:31 PM EDT -----  Please let the patient know that the heart function is normal on ECHO.

## 2023-03-24 RX ORDER — LOSARTAN POTASSIUM 100 MG/1
100 TABLET ORAL DAILY
Qty: 90 TABLET | Refills: 3 | Status: SHIPPED | OUTPATIENT
Start: 2023-03-24

## 2023-03-24 RX ORDER — CLOPIDOGREL BISULFATE 75 MG/1
75 TABLET ORAL DAILY
Qty: 90 TABLET | Refills: 3 | Status: SHIPPED | OUTPATIENT
Start: 2023-03-24

## 2023-03-24 NOTE — TELEPHONE ENCOUNTER
MEDICATION REFILL REQUEST      Name of Medication:  Clopidogrel  Dose:  75 mg  Frequency:  qd  Quantity:  ?  Days' supply:  ? Pharmacy Name/Location:  call pt he did not leave pharmacy info    MEDICATION REFILL REQUEST      Name of Medication:  losartan  Dose:  100 mg  Frequency:  QD  Quantity:  ?  Days' supply:  ?       Pharmacy Name/Location:  please call pt

## 2023-04-22 ENCOUNTER — HEALTH MAINTENANCE LETTER (OUTPATIENT)
Age: 68
End: 2023-04-22

## 2023-06-06 NOTE — TELEPHONE ENCOUNTER
Pharmacy    [facility] - Priscilla Peoples [phone] - F [phone]   [address] [place] [address]   Phone:  [phone]  Fax:  [phone]         ezetimibe (ZETIA) 10 MG tablet [[id number]]     Order Details  Dose: 10 mg Route: Oral Frequency: Nightly   Dispense Quantity: -- Refills: --          Sig: Take 10 mg by mouth at bedtime             carvedilol (COREG) 3.125 MG tablet [[id number]]     Order Details  Dose: 3.125 mg Route: Oral Frequency: DAILY   Dispense Quantity: -- Refills: --          Sig: Take 3.125 mg by mouth daily                     clopidogrel (PLAVIX) 75 MG tablet [[id number]]     Order Details  Dose: 75 mg Route: Oral Frequency: DAILY   Dispense Quantity: 90 tablet Refills: 3          Sig: Take 1 tablet by mouth daily     losartan (COZAAR) 100 MG tablet [[id number]]     Order Details  Dose: 100 mg Route: Oral Frequency: DAILY   Dispense Quantity: 90 tablet Refills: 3          Sig: Take 1 tablet by mouth daily   nitroGLYCERIN (NITROSTAT) 0.4 MG SL tablet [[id number]]     Order Details  Dose: 0.4 mg Route: SubLINGual Frequency: EVERY 5 MIN PRN for Chest pain, take one tablet by mouth under tongue every 5 min x 3 as neede for chest pain. If no relief by 3rd dose call 911. Dispense Quantity: 25 tablet Refills: 11          Sig: Place 1 tablet under the tongue every 5 minutes as needed for Chest pain (take one tablet by mouth under tongue every 5 min x 3 as neede for chest pain. If no relief by 3rd dose call 911.)     amLODIPine (NORVASC) 10 MG tablet [[id number]]     Order Details  Dose, Route, Frequency: As Directed   Dispense Quantity: -- Refills: --          Sig: TAKE 1 TABLET BY MOUTH EVERY DAY     carvedilol (COREG) 3.125 MG tablet [[id number]]     Order Details  Dose: 3.125 mg Route: Oral Frequency: DAILY   Dispense Quantity: -- Refills: --          Sig: Take 3.125 mg by mouth daily

## 2023-06-07 RX ORDER — NITROGLYCERIN 0.4 MG/1
0.4 TABLET SUBLINGUAL EVERY 5 MIN PRN
Qty: 25 TABLET | Refills: 11 | Status: SHIPPED | OUTPATIENT
Start: 2023-06-07

## 2023-06-07 RX ORDER — EZETIMIBE 10 MG/1
10 TABLET ORAL NIGHTLY
Qty: 90 TABLET | Refills: 3 | Status: SHIPPED | OUTPATIENT
Start: 2023-06-07

## 2023-06-07 RX ORDER — CLOPIDOGREL BISULFATE 75 MG/1
75 TABLET ORAL DAILY
Qty: 90 TABLET | Refills: 3 | Status: SHIPPED | OUTPATIENT
Start: 2023-06-07

## 2023-06-07 RX ORDER — AMLODIPINE BESYLATE 10 MG/1
10 TABLET ORAL DAILY
Qty: 90 TABLET | Refills: 3 | Status: SHIPPED | OUTPATIENT
Start: 2023-06-07

## 2023-06-07 RX ORDER — CARVEDILOL 3.12 MG/1
3.12 TABLET ORAL DAILY
Qty: 180 TABLET | Refills: 3 | Status: SHIPPED | OUTPATIENT
Start: 2023-06-07

## 2023-06-07 RX ORDER — LOSARTAN POTASSIUM 100 MG/1
100 TABLET ORAL DAILY
Qty: 90 TABLET | Refills: 3 | Status: SHIPPED | OUTPATIENT
Start: 2023-06-07

## 2023-06-07 NOTE — TELEPHONE ENCOUNTER
Medication Refills go to     Pharmacy    [facility] - [place] [address] [address]   Phone:  [phone]  Fax:  [phone]

## 2023-08-28 ENCOUNTER — OFFICE VISIT (OUTPATIENT)
Dept: INTERNAL MEDICINE CLINIC | Facility: CLINIC | Age: 68
End: 2023-08-28
Payer: MEDICARE

## 2023-08-28 VITALS
DIASTOLIC BLOOD PRESSURE: 60 MMHG | SYSTOLIC BLOOD PRESSURE: 130 MMHG | HEART RATE: 60 BPM | BODY MASS INDEX: 40.28 KG/M2 | WEIGHT: 297.4 LBS | HEIGHT: 72 IN

## 2023-08-28 DIAGNOSIS — H60.311 ACUTE DIFFUSE OTITIS EXTERNA OF RIGHT EAR: ICD-10-CM

## 2023-08-28 PROCEDURE — 1036F TOBACCO NON-USER: CPT | Performed by: INTERNAL MEDICINE

## 2023-08-28 PROCEDURE — 99213 OFFICE O/P EST LOW 20 MIN: CPT | Performed by: INTERNAL MEDICINE

## 2023-08-28 PROCEDURE — 4130F TOPICAL PREP RX AOE: CPT | Performed by: INTERNAL MEDICINE

## 2023-08-28 PROCEDURE — 3075F SYST BP GE 130 - 139MM HG: CPT | Performed by: INTERNAL MEDICINE

## 2023-08-28 PROCEDURE — G8417 CALC BMI ABV UP PARAM F/U: HCPCS | Performed by: INTERNAL MEDICINE

## 2023-08-28 PROCEDURE — 3078F DIAST BP <80 MM HG: CPT | Performed by: INTERNAL MEDICINE

## 2023-08-28 PROCEDURE — 3017F COLORECTAL CA SCREEN DOC REV: CPT | Performed by: INTERNAL MEDICINE

## 2023-08-28 PROCEDURE — 1123F ACP DISCUSS/DSCN MKR DOCD: CPT | Performed by: INTERNAL MEDICINE

## 2023-08-28 PROCEDURE — G8428 CUR MEDS NOT DOCUMENT: HCPCS | Performed by: INTERNAL MEDICINE

## 2023-08-28 RX ORDER — CIPROFLOXACIN 0.5 MG/.25ML
0.25 SOLUTION/ DROPS AURICULAR (OTIC) 2 TIMES DAILY
Qty: 1 EACH | Refills: 0 | Status: SHIPPED | OUTPATIENT
Start: 2023-08-28

## 2023-08-28 ASSESSMENT — ENCOUNTER SYMPTOMS
EYE PAIN: 0
CHOKING: 0
RECTAL PAIN: 0
STRIDOR: 0
VOICE CHANGE: 0

## 2023-08-28 NOTE — PROGRESS NOTES
FOLLOWUP VISIT    Subjective:    Mr. Nimisha Anguiano is a 76 y.o., male,   Chief Complaint   Patient presents with    Otalgia     Right and feels like water is in       HPI:    Patient went swimming and felt like he got water in the ear that he could not dry out. Now he is having right ear pain and muffled hearing. No fever or systemic symptoms. No facial numbness or weakness. He is diabetic. The following portions of the patient's history were reviewed and updated as appropriate:      Past Medical History:   Diagnosis Date    Coronary artery disease     S/P MI / stents.     Diabetes mellitus, type 2 (HCC)     Hyperlipidemia associated with type 2 diabetes mellitus (HCC)     Hypertension, essential     Morbid obesity (HCC)     Statin intolerance        Past Surgical History:   Procedure Laterality Date    SHOULDER SURGERY Left     labral tear / rotator cuff       Family History   Problem Relation Age of Onset    Cervical Cancer Mother     Heart Attack Father        Social History     Socioeconomic History    Marital status:      Spouse name: Not on file    Number of children: 4    Years of education: Not on file    Highest education level: Not on file   Occupational History     Comment: self employed [employer]   Tobacco Use    Smoking status: Never    Smokeless tobacco: Never   Substance and Sexual Activity    Alcohol use: Not Currently    Drug use: Never    Sexual activity: Not on file   Other Topics Concern    Not on file   Social History Narrative    Not on file     Social Determinants of Health     Financial Resource Strain: Not on file   Food Insecurity: Not on file   Transportation Needs: Not on file   Physical Activity: Not on file   Stress: Not on file   Social Connections: Not on file   Intimate Partner Violence: Not on file   Housing Stability: Not on file       Current Outpatient Medications   Medication Sig Dispense Refill    ciprofloxacin HCl (CETRAXAL) 0.2 % otic solution Place 0.25 mLs into

## 2023-08-29 ENCOUNTER — TELEPHONE (OUTPATIENT)
Dept: INTERNAL MEDICINE CLINIC | Facility: CLINIC | Age: 68
End: 2023-08-29

## 2023-08-29 NOTE — TELEPHONE ENCOUNTER
Patient called regarding referral to [facility] and ear drop. [employer] message sent to patient given phone number for [facility] and also about medication.

## 2023-08-31 ENCOUNTER — OFFICE VISIT (OUTPATIENT)
Dept: ENT CLINIC | Age: 68
End: 2023-08-31
Payer: MEDICARE

## 2023-08-31 VITALS — RESPIRATION RATE: 19 BRPM | HEIGHT: 72 IN | WEIGHT: 301 LBS | BODY MASS INDEX: 40.77 KG/M2 | HEART RATE: 77 BPM

## 2023-08-31 DIAGNOSIS — T16.1XXA ACUTE FOREIGN BODY OF EAR CANAL, RIGHT, INITIAL ENCOUNTER: Primary | ICD-10-CM

## 2023-08-31 DIAGNOSIS — H60.531 ACUTE CONTACT OTITIS EXTERNA OF RIGHT EAR: ICD-10-CM

## 2023-08-31 PROCEDURE — 3017F COLORECTAL CA SCREEN DOC REV: CPT | Performed by: STUDENT IN AN ORGANIZED HEALTH CARE EDUCATION/TRAINING PROGRAM

## 2023-08-31 PROCEDURE — 99203 OFFICE O/P NEW LOW 30 MIN: CPT | Performed by: STUDENT IN AN ORGANIZED HEALTH CARE EDUCATION/TRAINING PROGRAM

## 2023-08-31 PROCEDURE — G8427 DOCREV CUR MEDS BY ELIG CLIN: HCPCS | Performed by: STUDENT IN AN ORGANIZED HEALTH CARE EDUCATION/TRAINING PROGRAM

## 2023-08-31 PROCEDURE — 1123F ACP DISCUSS/DSCN MKR DOCD: CPT | Performed by: STUDENT IN AN ORGANIZED HEALTH CARE EDUCATION/TRAINING PROGRAM

## 2023-08-31 PROCEDURE — 69200 CLEAR OUTER EAR CANAL: CPT | Performed by: STUDENT IN AN ORGANIZED HEALTH CARE EDUCATION/TRAINING PROGRAM

## 2023-08-31 PROCEDURE — 4130F TOPICAL PREP RX AOE: CPT | Performed by: STUDENT IN AN ORGANIZED HEALTH CARE EDUCATION/TRAINING PROGRAM

## 2023-08-31 PROCEDURE — G8417 CALC BMI ABV UP PARAM F/U: HCPCS | Performed by: STUDENT IN AN ORGANIZED HEALTH CARE EDUCATION/TRAINING PROGRAM

## 2023-08-31 PROCEDURE — 1036F TOBACCO NON-USER: CPT | Performed by: STUDENT IN AN ORGANIZED HEALTH CARE EDUCATION/TRAINING PROGRAM

## 2023-08-31 ASSESSMENT — ENCOUNTER SYMPTOMS
EYE PAIN: 0
CHOKING: 0
COUGH: 0
SINUS PRESSURE: 0
SHORTNESS OF BREATH: 0
SINUS PAIN: 0
FACIAL SWELLING: 0
EYE ITCHING: 0
DIARRHEA: 0
NAUSEA: 0
EYE DISCHARGE: 0
WHEEZING: 0
STRIDOR: 0
CONSTIPATION: 0
APNEA: 0

## 2023-08-31 NOTE — PROGRESS NOTES
HPI:  Rhoda Real is a 76 y.o. male seen New    Chief Complaint   Patient presents with    Ear Problem     Patient presents today with c/o R sided ear fullness , soreness , and decreased hearing x 7 days . Patient states that this began as swimmer's ear . 69-year-old male seen for a new patient referral evaluation with concern of right-sided ear infection. This all started after having water exposure in a pool last week. No significant otologic history in the past.  He has had 1 week of progressive right-sided ear fullness clogged sensation otalgia otorrhea. This progressed and therefore he presented to his PCP was told that he had a right-sided swimmer's ear. He was started on ciprofloxacin eardrops 48 hours ago with minimal improvement. Was told that there was something \"black\" within his right ear prompting ENT referral.    Past Medical History, Past Surgical History, Family history, Social History, and Medications were all reviewed with the patient today and updated as necessary.      Allergies   Allergen Reactions    Dye [Iodides] Other (See Comments)     IV contrast nephropathy    Statins Other (See Comments)     Muscle aches        Patient Active Problem List   Diagnosis    Encounter for immunization    Coronary artery disease    Diabetes mellitus, type 2 ([address]    Hyperlipidemia    Hypertension, essential    Morbid obesity with BMI of 40.0-44.9, adult ([facility])    Statin intolerance    Chronic dyspnea    Asymptomatic stenosis of right carotid artery    Acute diffuse otitis externa of right ear       Current Outpatient Medications   Medication Sig    ciprofloxacin HCl (CETRAXAL) 0.2 % otic solution Place 0.25 mLs into the right ear 2 times daily    clopidogrel (PLAVIX) 75 MG tablet Take 1 tablet by mouth daily    losartan (COZAAR) 100 MG tablet Take 1 tablet by mouth daily    nitroGLYCERIN (NITROSTAT) 0.4 MG SL tablet Place 1 tablet under the tongue every 5 minutes as needed for Chest pain (take one

## 2023-09-08 ENCOUNTER — OFFICE VISIT (OUTPATIENT)
Age: 68
End: 2023-09-08
Payer: MEDICARE

## 2023-09-08 VITALS
BODY MASS INDEX: 42.8 KG/M2 | HEIGHT: 70 IN | DIASTOLIC BLOOD PRESSURE: 86 MMHG | WEIGHT: 299 LBS | SYSTOLIC BLOOD PRESSURE: 158 MMHG | OXYGEN SATURATION: 97 % | HEART RATE: 54 BPM

## 2023-09-08 DIAGNOSIS — I10 HYPERTENSION, UNSPECIFIED TYPE: ICD-10-CM

## 2023-09-08 DIAGNOSIS — I25.10 CAD IN NATIVE ARTERY: Primary | ICD-10-CM

## 2023-09-08 DIAGNOSIS — I65.21 ASYMPTOMATIC STENOSIS OF RIGHT CAROTID ARTERY: ICD-10-CM

## 2023-09-08 DIAGNOSIS — R06.09 CHRONIC DYSPNEA: ICD-10-CM

## 2023-09-08 DIAGNOSIS — E78.5 HYPERLIPIDEMIA, UNSPECIFIED HYPERLIPIDEMIA TYPE: ICD-10-CM

## 2023-09-08 PROCEDURE — 3079F DIAST BP 80-89 MM HG: CPT | Performed by: INTERNAL MEDICINE

## 2023-09-08 PROCEDURE — 1123F ACP DISCUSS/DSCN MKR DOCD: CPT | Performed by: INTERNAL MEDICINE

## 2023-09-08 PROCEDURE — 99214 OFFICE O/P EST MOD 30 MIN: CPT | Performed by: INTERNAL MEDICINE

## 2023-09-08 PROCEDURE — 3017F COLORECTAL CA SCREEN DOC REV: CPT | Performed by: INTERNAL MEDICINE

## 2023-09-08 PROCEDURE — 3077F SYST BP >= 140 MM HG: CPT | Performed by: INTERNAL MEDICINE

## 2023-09-08 PROCEDURE — G8417 CALC BMI ABV UP PARAM F/U: HCPCS | Performed by: INTERNAL MEDICINE

## 2023-09-08 PROCEDURE — 1036F TOBACCO NON-USER: CPT | Performed by: INTERNAL MEDICINE

## 2023-09-08 PROCEDURE — G8427 DOCREV CUR MEDS BY ELIG CLIN: HCPCS | Performed by: INTERNAL MEDICINE

## 2023-09-08 RX ORDER — TORSEMIDE 10 MG/1
10 TABLET ORAL DAILY
Qty: 90 TABLET | Refills: 3 | Status: CANCELLED | OUTPATIENT
Start: 2023-09-08

## 2023-11-28 ENCOUNTER — NURSE ONLY (OUTPATIENT)
Age: 68
End: 2023-11-28
Payer: MEDICARE

## 2023-11-28 VITALS — BODY MASS INDEX: 42.62 KG/M2 | SYSTOLIC BLOOD PRESSURE: 194 MMHG | DIASTOLIC BLOOD PRESSURE: 82 MMHG | WEIGHT: 297 LBS

## 2023-11-28 DIAGNOSIS — I25.10 CAD IN NATIVE ARTERY: ICD-10-CM

## 2023-11-28 DIAGNOSIS — R07.9 CHEST PAIN: Primary | ICD-10-CM

## 2023-11-28 PROCEDURE — 93000 ELECTROCARDIOGRAM COMPLETE: CPT | Performed by: INTERNAL MEDICINE

## 2023-11-28 NOTE — PROGRESS NOTES
Patient presented to [facility] office with complaints of increasing intermittent chest pain radiating to left arm. Notes assoc SOB, not assoc with movement or exertion. Currently 1-2/10 if left chest near axillae. Walked 20 minutes at park today without discomfort. EKG completed and routed to Dr Mallory Allison for review. Also requesting handicap placard due to intermittent claudication. See pcp for placard per Dr Mallory Allison. Per Dr Franci Ballesteros pt's symptoms are intermittent and controlled by ntg, pt may go to ED for urgent workup or schedule nuclear study as an outpatient. Pt states he prefers outpt nuclear study. Two day study scheduled this week. Pt advised to seek ED evaluation for worsening or unrelieved sx.

## 2023-12-04 ENCOUNTER — TELEPHONE (OUTPATIENT)
Dept: CARDIOLOGY CLINIC | Age: 68
End: 2023-12-04

## 2023-12-04 NOTE — TELEPHONE ENCOUNTER
----- Message from Mauricio Sylvester RN sent at 12/4/2023  8:07 AM EST -----    ----- Message -----  From: Henrique Silva MD  Sent: 12/1/2023   5:08 PM EST  To: [facility]    Please let the patient know that the patient had an abnormal stress test. Therefore, the patient will need to follow-up with me as soon as possible within the next 7 days, at my earliest availability, to go over the next step. Please let me know if there are scheduling issues. It is acceptable to add the patient onto my schedule on a day with no more than 27 patients currently scheduled during a :45 time slot.

## 2023-12-04 NOTE — TELEPHONE ENCOUNTER
----- Message from Lou Arita RN sent at 12/4/2023  8:07 AM EST -----    ----- Message -----  From: Filemon Hull MD  Sent: 12/1/2023   5:08 PM EST  To: [facility] T[facility]    Please let the patient know that the patient had an abnormal stress test. Therefore, the patient will need to follow-up with me as soon as possible within the next 7 days, at my earliest availability, to go over the next step. Please let me know if there are scheduling issues. It is acceptable to add the patient onto my schedule on a day with no more than 27 patients currently scheduled during a :45 time slot.

## 2023-12-06 NOTE — PROGRESS NOTES
[facility] CARDIOLOGY Follow Up                 Reason for Visit: Abnormal cardiovascular stress test    Subjective:     Patient is a 76 y.o. male with a PMH of CAD status post PCI, asymptomatic right ICA stenosis, hypertension, hyperlipidemia, and statin intolerance who presents for follow-up. The patient was last seen in September 2023. He had an LHC in February 2022 where he was noted to have a patent LCx stent and ISR of a BMS of the distal RCA with a 90% stenosis of the first RPL. The patient underwent balloon angioplasty and brachytherapy of the distal RCA, according to documentation. It should be noted that he had a LHC performed just 3 months prior to the aforementioned LHC where he was noted to have a 70% ostial LAD stenosis as well. Last clinic appointment, it was noted that the patient was not interested in initiating a PCSK9 inhibitor. He has a history of statin intolerance. He recently walked in the [facility] cardiology clinic without an appointment for a nurse visit. He reported more frequent chest pain at home and preferred not going to the ER. An MPI was ordered and noted a \"large area of inferoapical reversible ischemia\" on December 1. The patient had a TTE in March 2023 that was noted to demonstrate a normal EF. The patient reports chest pain \"off and on\" at rest for which he takes SL NTG. The patient reports he takes SL NTG every other day for his pain when it occurs. He reports he walks without angina, however, and denies exertional angina. He does report chronic ENGEL. Past Medical History:   Diagnosis Date    Coronary artery disease     S/P MI / stents.     Diabetes mellitus, type 2 ([address])     Hyperlipidemia associated with type 2 diabetes mellitus ([address])     Hypertension, essential     Morbid obesity ([address])     Statin intolerance       Past Surgical History:   Procedure Laterality Date    SHOULDER SURGERY Left     labral tear / rotator cuff      Family History   Problem Relation

## 2023-12-07 ENCOUNTER — OFFICE VISIT (OUTPATIENT)
Age: 68
End: 2023-12-07
Payer: MEDICARE

## 2023-12-07 VITALS
SYSTOLIC BLOOD PRESSURE: 146 MMHG | WEIGHT: 293 LBS | HEART RATE: 60 BPM | HEIGHT: 70 IN | BODY MASS INDEX: 41.95 KG/M2 | DIASTOLIC BLOOD PRESSURE: 86 MMHG

## 2023-12-07 DIAGNOSIS — I25.10 CAD IN NATIVE ARTERY: ICD-10-CM

## 2023-12-07 DIAGNOSIS — R94.39 ABNORMAL CARDIOVASCULAR STRESS TEST: ICD-10-CM

## 2023-12-07 DIAGNOSIS — I10 HYPERTENSION, UNSPECIFIED TYPE: ICD-10-CM

## 2023-12-07 DIAGNOSIS — E78.5 HYPERLIPIDEMIA, UNSPECIFIED HYPERLIPIDEMIA TYPE: ICD-10-CM

## 2023-12-07 DIAGNOSIS — R06.09 CHRONIC DYSPNEA: ICD-10-CM

## 2023-12-07 DIAGNOSIS — R94.39 ABNORMAL CARDIOVASCULAR STRESS TEST: Primary | ICD-10-CM

## 2023-12-07 DIAGNOSIS — I65.21 ASYMPTOMATIC STENOSIS OF RIGHT CAROTID ARTERY: ICD-10-CM

## 2023-12-07 LAB
ANION GAP SERPL CALC-SCNC: 7 MMOL/L (ref 2–11)
BUN SERPL-MCNC: 16 MG/DL (ref 8–23)
CALCIUM SERPL-MCNC: 9.1 MG/DL (ref 8.3–10.4)
CHLORIDE SERPL-SCNC: 106 MMOL/L (ref 103–113)
CO2 SERPL-SCNC: 28 MMOL/L (ref 21–32)
CREAT SERPL-MCNC: 1.4 MG/DL (ref 0.8–1.5)
ERYTHROCYTE [DISTWIDTH] IN BLOOD BY AUTOMATED COUNT: 13.2 % (ref 11.9–14.6)
GLUCOSE SERPL-MCNC: 151 MG/DL (ref 65–100)
HCT VFR BLD AUTO: 45.9 % (ref 41.1–50.3)
HGB BLD-MCNC: 14.9 G/DL (ref 13.6–17.2)
MCH RBC QN AUTO: 28.4 PG (ref 26.1–32.9)
MCHC RBC AUTO-ENTMCNC: 32.5 G/DL (ref 31.4–35)
MCV RBC AUTO: 87.4 FL (ref 82–102)
NRBC # BLD: 0 K/UL (ref 0–0.2)
PLATELET # BLD AUTO: 229 K/UL (ref 150–450)
PMV BLD AUTO: 9.7 FL (ref 9.4–12.3)
POTASSIUM SERPL-SCNC: 4.5 MMOL/L (ref 3.5–5.1)
RBC # BLD AUTO: 5.25 M/UL (ref 4.23–5.6)
SODIUM SERPL-SCNC: 141 MMOL/L (ref 136–146)
WBC # BLD AUTO: 8.9 K/UL (ref 4.3–11.1)

## 2023-12-07 PROCEDURE — 1123F ACP DISCUSS/DSCN MKR DOCD: CPT | Performed by: INTERNAL MEDICINE

## 2023-12-07 PROCEDURE — G8428 CUR MEDS NOT DOCUMENT: HCPCS | Performed by: INTERNAL MEDICINE

## 2023-12-07 PROCEDURE — 1036F TOBACCO NON-USER: CPT | Performed by: INTERNAL MEDICINE

## 2023-12-07 PROCEDURE — 3077F SYST BP >= 140 MM HG: CPT | Performed by: INTERNAL MEDICINE

## 2023-12-07 PROCEDURE — G8484 FLU IMMUNIZE NO ADMIN: HCPCS | Performed by: INTERNAL MEDICINE

## 2023-12-07 PROCEDURE — G8417 CALC BMI ABV UP PARAM F/U: HCPCS | Performed by: INTERNAL MEDICINE

## 2023-12-07 PROCEDURE — 3017F COLORECTAL CA SCREEN DOC REV: CPT | Performed by: INTERNAL MEDICINE

## 2023-12-07 PROCEDURE — 99214 OFFICE O/P EST MOD 30 MIN: CPT | Performed by: INTERNAL MEDICINE

## 2023-12-07 PROCEDURE — 3079F DIAST BP 80-89 MM HG: CPT | Performed by: INTERNAL MEDICINE

## 2023-12-08 ENCOUNTER — TELEPHONE (OUTPATIENT)
Age: 68
End: 2023-12-08

## 2023-12-08 NOTE — TELEPHONE ENCOUNTER
Advised patient of lab results and Dr. Chi Marie response. Patient verbalized understanding. Message sent to [facility] Lab requesting pre-cath fluids.

## 2023-12-08 NOTE — TELEPHONE ENCOUNTER
----- Message from Harry Apodaca MD sent at 12/7/2023  5:54 PM EST -----  Please let the patient know that his lab work is acceptable for an angiogram; however, I would like him to get pre-cath fluids prior to angiography in the setting of CKD stage III-range renal insufficiency. Please alert the Cath Lab.

## 2023-12-12 NOTE — PROGRESS NOTES
Patient pre-assessment complete for [facility] scheduled for Dr Miguel Angel Burrows, arrival time 0600. Patient verified using . NPO status reinforced. Patient informed to take a full dose aspirin 325mg  or 81 mg x 4 on the day of procedure. . Instructed they can take all other medications excluding vitamins & supplements. Patient verbalizes understanding of all instructions & denies any questions at this time.

## 2023-12-13 ENCOUNTER — HOSPITAL ENCOUNTER (OUTPATIENT)
Age: 68
Setting detail: OBSERVATION
Discharge: HOME | End: 2023-12-14
Attending: INTERNAL MEDICINE | Admitting: INTERNAL MEDICINE
Payer: MEDICARE

## 2023-12-13 DIAGNOSIS — I25.10 CAD S/P PERCUTANEOUS CORONARY ANGIOPLASTY: Primary | ICD-10-CM

## 2023-12-13 DIAGNOSIS — Z98.61 CAD S/P PERCUTANEOUS CORONARY ANGIOPLASTY: Primary | ICD-10-CM

## 2023-12-13 DIAGNOSIS — R94.39 ABNORMAL CARDIOVASCULAR STRESS TEST: ICD-10-CM

## 2023-12-13 LAB
ACT BLD: 228 SECS (ref 70–128)
ACT BLD: 260 SECS (ref 70–128)
ANION GAP SERPL CALC-SCNC: 6 MMOL/L (ref 2–11)
BUN SERPL-MCNC: 21 MG/DL (ref 8–23)
CALCIUM SERPL-MCNC: 8.8 MG/DL (ref 8.3–10.4)
CHLORIDE SERPL-SCNC: 106 MMOL/L (ref 103–113)
CO2 SERPL-SCNC: 26 MMOL/L (ref 21–32)
CREAT SERPL-MCNC: 1.3 MG/DL (ref 0.8–1.5)
CREAT SERPL-MCNC: 1.4 MG/DL (ref 0.8–1.5)
EKG ATRIAL RATE: 50 BPM
EKG ATRIAL RATE: 52 BPM
EKG DIAGNOSIS: NORMAL
EKG DIAGNOSIS: NORMAL
EKG P AXIS: 46 DEGREES
EKG P AXIS: 50 DEGREES
EKG P-R INTERVAL: 202 MS
EKG P-R INTERVAL: 204 MS
EKG Q-T INTERVAL: 450 MS
EKG Q-T INTERVAL: 458 MS
EKG QRS DURATION: 110 MS
EKG QRS DURATION: 110 MS
EKG QTC CALCULATION (BAZETT): 410 MS
EKG QTC CALCULATION (BAZETT): 425 MS
EKG R AXIS: -2 DEGREES
EKG R AXIS: -45 DEGREES
EKG T AXIS: 62 DEGREES
EKG T AXIS: 65 DEGREES
EKG VENTRICULAR RATE: 50 BPM
EKG VENTRICULAR RATE: 52 BPM
GLUCOSE SERPL-MCNC: 149 MG/DL (ref 65–100)
POTASSIUM SERPL-SCNC: 3.9 MMOL/L (ref 3.5–5.1)
SODIUM SERPL-SCNC: 138 MMOL/L (ref 136–146)

## 2023-12-13 PROCEDURE — 93458 L HRT ARTERY/VENTRICLE ANGIO: CPT | Performed by: INTERNAL MEDICINE

## 2023-12-13 PROCEDURE — C1725 CATH, TRANSLUMIN NON-LASER: HCPCS | Performed by: INTERNAL MEDICINE

## 2023-12-13 PROCEDURE — 6360000004 HC RX CONTRAST MEDICATION: Performed by: INTERNAL MEDICINE

## 2023-12-13 PROCEDURE — C1753 CATH, INTRAVAS ULTRASOUND: HCPCS | Performed by: INTERNAL MEDICINE

## 2023-12-13 PROCEDURE — 36415 COLL VENOUS BLD VENIPUNCTURE: CPT

## 2023-12-13 PROCEDURE — C1887 CATHETER, GUIDING: HCPCS | Performed by: INTERNAL MEDICINE

## 2023-12-13 PROCEDURE — 2500000003 HC RX 250 WO HCPCS: Performed by: INTERNAL MEDICINE

## 2023-12-13 PROCEDURE — C1874 STENT, COATED/COV W/DEL SYS: HCPCS | Performed by: INTERNAL MEDICINE

## 2023-12-13 PROCEDURE — C1894 INTRO/SHEATH, NON-LASER: HCPCS | Performed by: INTERNAL MEDICINE

## 2023-12-13 PROCEDURE — 99153 MOD SED SAME PHYS/QHP EA: CPT | Performed by: INTERNAL MEDICINE

## 2023-12-13 PROCEDURE — 93005 ELECTROCARDIOGRAM TRACING: CPT | Performed by: INTERNAL MEDICINE

## 2023-12-13 PROCEDURE — 2580000003 HC RX 258

## 2023-12-13 PROCEDURE — C1769 GUIDE WIRE: HCPCS | Performed by: INTERNAL MEDICINE

## 2023-12-13 PROCEDURE — 92978 ENDOLUMINL IVUS OCT C 1ST: CPT | Performed by: INTERNAL MEDICINE

## 2023-12-13 PROCEDURE — 93010 ELECTROCARDIOGRAM REPORT: CPT | Performed by: INTERNAL MEDICINE

## 2023-12-13 PROCEDURE — C9600 PERC DRUG-EL COR STENT SING: HCPCS | Performed by: INTERNAL MEDICINE

## 2023-12-13 PROCEDURE — 2580000003 HC RX 258: Performed by: INTERNAL MEDICINE

## 2023-12-13 PROCEDURE — 85347 COAGULATION TIME ACTIVATED: CPT

## 2023-12-13 PROCEDURE — 92920 PRQ TRLUML C ANGIOP 1ART&/BR: CPT | Performed by: INTERNAL MEDICINE

## 2023-12-13 PROCEDURE — 2709999900 HC NON-CHARGEABLE SUPPLY: Performed by: INTERNAL MEDICINE

## 2023-12-13 PROCEDURE — 6360000002 HC RX W HCPCS: Performed by: INTERNAL MEDICINE

## 2023-12-13 PROCEDURE — 80048 BASIC METABOLIC PNL TOTAL CA: CPT

## 2023-12-13 PROCEDURE — G0378 HOSPITAL OBSERVATION PER HR: HCPCS

## 2023-12-13 PROCEDURE — 99152 MOD SED SAME PHYS/QHP 5/>YRS: CPT | Performed by: INTERNAL MEDICINE

## 2023-12-13 PROCEDURE — 6370000000 HC RX 637 (ALT 250 FOR IP): Performed by: INTERNAL MEDICINE

## 2023-12-13 PROCEDURE — 92979 ENDOLUMINL IVUS OCT C EA: CPT | Performed by: INTERNAL MEDICINE

## 2023-12-13 DEVICE — STENT ONYXNG35012UX ONYX 3.50X12RX
Type: IMPLANTABLE DEVICE | Status: FUNCTIONAL
Brand: ONYX FRONTIER™

## 2023-12-13 RX ORDER — CARVEDILOL 3.12 MG/1
3.12 TABLET ORAL DAILY
Status: DISCONTINUED | OUTPATIENT
Start: 2023-12-13 | End: 2023-12-14 | Stop reason: HOSPADM

## 2023-12-13 RX ORDER — LIDOCAINE HYDROCHLORIDE 10 MG/ML
INJECTION, SOLUTION INFILTRATION; PERINEURAL PRN
Status: DISCONTINUED | OUTPATIENT
Start: 2023-12-13 | End: 2023-12-13 | Stop reason: HOSPADM

## 2023-12-13 RX ORDER — ASPIRIN 81 MG/1
324 TABLET, CHEWABLE ORAL ONCE
Status: DISCONTINUED | OUTPATIENT
Start: 2023-12-13 | End: 2023-12-14 | Stop reason: HOSPADM

## 2023-12-13 RX ORDER — SODIUM CHLORIDE 0.9 % (FLUSH) 0.9 %
5-40 SYRINGE (ML) INJECTION PRN
Status: DISCONTINUED | OUTPATIENT
Start: 2023-12-13 | End: 2023-12-14 | Stop reason: HOSPADM

## 2023-12-13 RX ORDER — NITROGLYCERIN 0.4 MG/1
0.4 TABLET SUBLINGUAL EVERY 5 MIN PRN
Status: DISCONTINUED | OUTPATIENT
Start: 2023-12-13 | End: 2023-12-14 | Stop reason: HOSPADM

## 2023-12-13 RX ORDER — ONDANSETRON 4 MG/1
4 TABLET, ORALLY DISINTEGRATING ORAL EVERY 8 HOURS PRN
Status: DISCONTINUED | OUTPATIENT
Start: 2023-12-13 | End: 2023-12-14 | Stop reason: HOSPADM

## 2023-12-13 RX ORDER — MORPHINE SULFATE 4 MG/ML
2 INJECTION, SOLUTION INTRAMUSCULAR; INTRAVENOUS
Status: DISCONTINUED | OUTPATIENT
Start: 2023-12-13 | End: 2023-12-14 | Stop reason: HOSPADM

## 2023-12-13 RX ORDER — ASPIRIN 81 MG/1
81 TABLET ORAL DAILY
Status: DISCONTINUED | OUTPATIENT
Start: 2023-12-13 | End: 2023-12-13

## 2023-12-13 RX ORDER — ASPIRIN 81 MG/1
81 TABLET ORAL DAILY
Status: DISCONTINUED | OUTPATIENT
Start: 2023-12-14 | End: 2023-12-14 | Stop reason: HOSPADM

## 2023-12-13 RX ORDER — ACETAMINOPHEN 325 MG/1
650 TABLET ORAL EVERY 4 HOURS PRN
Status: DISCONTINUED | OUTPATIENT
Start: 2023-12-13 | End: 2023-12-14 | Stop reason: HOSPADM

## 2023-12-13 RX ORDER — POTASSIUM CHLORIDE 20 MEQ/1
40 TABLET, EXTENDED RELEASE ORAL PRN
Status: DISCONTINUED | OUTPATIENT
Start: 2023-12-13 | End: 2023-12-14 | Stop reason: HOSPADM

## 2023-12-13 RX ORDER — ATROPINE SULFATE 0.4 MG/ML
0.5 INJECTION, SOLUTION INTRAVENOUS
Status: DISCONTINUED | OUTPATIENT
Start: 2023-12-13 | End: 2023-12-14 | Stop reason: HOSPADM

## 2023-12-13 RX ORDER — MIDAZOLAM HYDROCHLORIDE 1 MG/ML
INJECTION INTRAMUSCULAR; INTRAVENOUS PRN
Status: DISCONTINUED | OUTPATIENT
Start: 2023-12-13 | End: 2023-12-13 | Stop reason: HOSPADM

## 2023-12-13 RX ORDER — HEPARIN SODIUM 10000 [USP'U]/ML
INJECTION, SOLUTION INTRAVENOUS; SUBCUTANEOUS PRN
Status: DISCONTINUED | OUTPATIENT
Start: 2023-12-13 | End: 2023-12-13 | Stop reason: HOSPADM

## 2023-12-13 RX ORDER — LOSARTAN POTASSIUM 50 MG/1
100 TABLET ORAL DAILY
Status: DISCONTINUED | OUTPATIENT
Start: 2023-12-13 | End: 2023-12-13

## 2023-12-13 RX ORDER — SODIUM CHLORIDE 9 MG/ML
INJECTION, SOLUTION INTRAVENOUS CONTINUOUS
Status: DISCONTINUED | OUTPATIENT
Start: 2023-12-13 | End: 2023-12-14 | Stop reason: HOSPADM

## 2023-12-13 RX ORDER — TRAMADOL HYDROCHLORIDE 50 MG/1
50 TABLET ORAL EVERY 6 HOURS PRN
Status: DISCONTINUED | OUTPATIENT
Start: 2023-12-13 | End: 2023-12-14 | Stop reason: HOSPADM

## 2023-12-13 RX ORDER — SODIUM CHLORIDE 0.9 % (FLUSH) 0.9 %
5-40 SYRINGE (ML) INJECTION EVERY 12 HOURS SCHEDULED
Status: DISCONTINUED | OUTPATIENT
Start: 2023-12-13 | End: 2023-12-14 | Stop reason: HOSPADM

## 2023-12-13 RX ORDER — MAGNESIUM SULFATE IN WATER 40 MG/ML
2000 INJECTION, SOLUTION INTRAVENOUS PRN
Status: DISCONTINUED | OUTPATIENT
Start: 2023-12-13 | End: 2023-12-14 | Stop reason: HOSPADM

## 2023-12-13 RX ORDER — TRAMADOL HYDROCHLORIDE 50 MG/1
100 TABLET ORAL EVERY 6 HOURS PRN
Status: DISCONTINUED | OUTPATIENT
Start: 2023-12-13 | End: 2023-12-14 | Stop reason: HOSPADM

## 2023-12-13 RX ORDER — LOSARTAN POTASSIUM 50 MG/1
100 TABLET ORAL DAILY
Status: DISCONTINUED | OUTPATIENT
Start: 2023-12-14 | End: 2023-12-14 | Stop reason: HOSPADM

## 2023-12-13 RX ORDER — SODIUM CHLORIDE 9 MG/ML
INJECTION, SOLUTION INTRAVENOUS PRN
Status: DISCONTINUED | OUTPATIENT
Start: 2023-12-13 | End: 2023-12-14 | Stop reason: HOSPADM

## 2023-12-13 RX ORDER — ACETAMINOPHEN 325 MG/1
650 TABLET ORAL EVERY 6 HOURS PRN
Status: DISCONTINUED | OUTPATIENT
Start: 2023-12-13 | End: 2023-12-14 | Stop reason: HOSPADM

## 2023-12-13 RX ORDER — POLYETHYLENE GLYCOL 3350 17 G/17G
17 POWDER, FOR SOLUTION ORAL DAILY PRN
Status: DISCONTINUED | OUTPATIENT
Start: 2023-12-13 | End: 2023-12-14 | Stop reason: HOSPADM

## 2023-12-13 RX ORDER — HEPARIN SODIUM 200 [USP'U]/100ML
INJECTION, SOLUTION INTRAVENOUS CONTINUOUS PRN
Status: COMPLETED | OUTPATIENT
Start: 2023-12-13 | End: 2023-12-13

## 2023-12-13 RX ORDER — POTASSIUM CHLORIDE 7.45 MG/ML
10 INJECTION INTRAVENOUS PRN
Status: DISCONTINUED | OUTPATIENT
Start: 2023-12-13 | End: 2023-12-14 | Stop reason: HOSPADM

## 2023-12-13 RX ORDER — 0.9 % SODIUM CHLORIDE 0.9 %
500 INTRAVENOUS SOLUTION INTRAVENOUS PRN
Status: DISCONTINUED | OUTPATIENT
Start: 2023-12-13 | End: 2023-12-14 | Stop reason: HOSPADM

## 2023-12-13 RX ORDER — MORPHINE SULFATE 4 MG/ML
2 INJECTION INTRAVENOUS
Status: DISCONTINUED | OUTPATIENT
Start: 2023-12-13 | End: 2023-12-14 | Stop reason: HOSPADM

## 2023-12-13 RX ORDER — AMLODIPINE BESYLATE 10 MG/1
10 TABLET ORAL DAILY
Status: DISCONTINUED | OUTPATIENT
Start: 2023-12-14 | End: 2023-12-14 | Stop reason: HOSPADM

## 2023-12-13 RX ORDER — ONDANSETRON 2 MG/ML
4 INJECTION INTRAMUSCULAR; INTRAVENOUS EVERY 6 HOURS PRN
Status: DISCONTINUED | OUTPATIENT
Start: 2023-12-13 | End: 2023-12-14 | Stop reason: HOSPADM

## 2023-12-13 RX ORDER — EZETIMIBE 10 MG/1
10 TABLET ORAL NIGHTLY
Status: DISCONTINUED | OUTPATIENT
Start: 2023-12-13 | End: 2023-12-14 | Stop reason: HOSPADM

## 2023-12-13 RX ORDER — POTASSIUM CHLORIDE 7.45 MG/ML
10 INJECTION INTRAVENOUS PRN
Status: DISCONTINUED | OUTPATIENT
Start: 2023-12-13 | End: 2023-12-13 | Stop reason: SDUPTHER

## 2023-12-13 RX ORDER — MORPHINE SULFATE 4 MG/ML
4 INJECTION INTRAVENOUS
Status: DISCONTINUED | OUTPATIENT
Start: 2023-12-13 | End: 2023-12-14 | Stop reason: HOSPADM

## 2023-12-13 RX ADMIN — SODIUM CHLORIDE, PRESERVATIVE FREE 10 ML: 5 INJECTION INTRAVENOUS at 20:23

## 2023-12-13 RX ADMIN — SODIUM CHLORIDE: 900 INJECTION, SOLUTION INTRAVENOUS at 06:48

## 2023-12-13 RX ADMIN — TICAGRELOR 90 MG: 90 TABLET ORAL at 20:23

## 2023-12-13 ASSESSMENT — PAIN SCALES - GENERAL
PAINLEVEL_OUTOF10: 0
PAINLEVEL_OUTOF10: 0

## 2023-12-13 ASSESSMENT — PAIN - FUNCTIONAL ASSESSMENT: PAIN_FUNCTIONAL_ASSESSMENT: 0-10

## 2023-12-13 ASSESSMENT — PAIN DESCRIPTION - DESCRIPTORS: DESCRIPTORS: SORE

## 2023-12-13 NOTE — CARE COORDINATION
Patient admitted in Obs status for + stress test. Patient underwent LHC with PCI. CM scanned medical record for discharge needs. CM remains available for consult. 12/13/23 6327   Service Assessment   Patient Orientation Alert and Oriented   Cognition Alert   History Provided By Patient   Primary Caregiver Self   Accompanied By/Relationship Unknown   Support Systems Spouse/Significant Other;Children   Patient's Healthcare Decision Maker is: Legal Next of Kin   PCP Verified by CM Yes   Last Visit to PCP Within last 6 months   Prior Functional Level Independent in ADLs/IADLs   Current Functional Level Independent in ADLs/IADLs   Can patient return to prior living arrangement Yes   Ability to make needs known: Good   Family able to assist with home care needs: Yes   Would you like for me to discuss the discharge plan with any other family members/significant others, and if so, who? No   Financial Resources Medicare   Community Resources None   Social/Functional History   Lives With Spouse   Type of [address] None   Receives Help From Family   ADL Assistance Independent   Active  Yes   Mode of Transportation Car   Occupation Self employed   Discharge Planning   Type of [facility] Prior To Admission None   Potential Assistance Needed N/A   DME Ordered? No   Potential Assistance Purchasing Medications No   Type of Home Care Services None   Patient expects to be discharged to: [facility] Discharge   Transition of Care Consult (CM Consult) Discharge [facility] Discharge None   [facility] Information Provided?  No   Mode of Transport at Discharge Other (see comment)  (Car)   Confirm Follow Up Transport Family   Condition of Participation: Discharge Planning   The Plan for Transition of Care is related to the following treatment goals: Home with family assistance

## 2023-12-13 NOTE — PROGRESS NOTES
TRANSFER - OUT REPORT:    Verbal report given to CADEN Lares on [employer]  being transferred to [facility] for routine progression of patient care       Report consisted of patient's Situation, Background, Assessment and   Recommendations(SBAR). Information from the following report(s) Nurse Handoff Report, Intake/Output, MAR, and Cardiac Rhythm sinus brandon  was reviewed with the receiving nurse. Lines:   Peripheral IV 12/13/23 Left Antecubital (Active)   Site Assessment Clean, dry & intact 12/13/23 1426   Line Status Capped;Flushed 12/13/23 1426   Line Care Cap changed 12/13/23 1426   Phlebitis Assessment No symptoms 12/13/23 1426   Infiltration Assessment 0 12/13/23 1426   Alcohol Cap Used Yes 12/13/23 1426   Dressing Status Clean, dry & intact 12/13/23 1426   Dressing Type Transparent 12/13/23 1426       Peripheral IV 12/13/23 Right Antecubital (Active)   Site Assessment Clean, dry & intact 12/13/23 1426   Line Status Capped;Flushed 12/13/23 1426   Line Care Cap changed 12/13/23 1426   Phlebitis Assessment No symptoms 12/13/23 1426   Infiltration Assessment 0 12/13/23 1426   Alcohol Cap Used Yes 12/13/23 1426   Dressing Status Clean, dry & intact 12/13/23 1426   Dressing Type Transparent 12/13/23 1426        Opportunity for questions and clarification was provided.       Patient transported with:  Monitor and Registered Nurse

## 2023-12-13 NOTE — PROGRESS NOTES
Patient received to [facility] room # 10  Ambulatory from [facility]. Patient scheduled for [facility] today with Dr BOONE. Procedure reviewed & questions answered, voiced good understanding consent obtained & placed on chart. All medications and medical history reviewed. Will prep patient per orders. Patient & family updated on plan of care. The patient has a fraility score of 3-MANAGING WELL, based on ambulation.     324mg of Aspirin and 75mg of Plavix taken at 0600

## 2023-12-13 NOTE — PROGRESS NOTES
TRANSFER - IN REPORT:    Verbal report received from michael on Shruti Soto  being received from [facility] for routine progression of patient care      Report consisted of patient's Situation, Background, Assessment and   Recommendations(SBAR). Information from the following report(s) Nurse Handoff Report, MAR, and Cardiac Rhythm Sinus brandon  was reviewed with the receiving nurse. Opportunity for questions and clarification was provided. Assessment completed upon patient's arrival to unit and care assumed. Right radial cath site with TR band, benign.

## 2023-12-13 NOTE — PROGRESS NOTES
IV HYDRATION  IF NO HISTORY OF CHF - Over 1 hour bolus with 3 ml/kg/hr (396 ml/hr), then reduce to 1 ml/kg/kr   (132 ml/hr) for 2 hrs pre procedure & for 3 hrs post procedure (a total of 6 hrs of hydration)

## 2023-12-14 VITALS
WEIGHT: 290.7 LBS | SYSTOLIC BLOOD PRESSURE: 149 MMHG | HEIGHT: 70 IN | RESPIRATION RATE: 18 BRPM | TEMPERATURE: 97.5 F | HEART RATE: 56 BPM | BODY MASS INDEX: 41.62 KG/M2 | DIASTOLIC BLOOD PRESSURE: 75 MMHG | OXYGEN SATURATION: 95 %

## 2023-12-14 LAB
ANION GAP SERPL CALC-SCNC: 5 MMOL/L (ref 2–11)
BUN SERPL-MCNC: 14 MG/DL (ref 8–23)
CALCIUM SERPL-MCNC: 8.5 MG/DL (ref 8.3–10.4)
CHLORIDE SERPL-SCNC: 110 MMOL/L (ref 103–113)
CO2 SERPL-SCNC: 24 MMOL/L (ref 21–32)
CREAT SERPL-MCNC: 1.3 MG/DL (ref 0.8–1.5)
ECHO BSA: 2.56 M2
ERYTHROCYTE [DISTWIDTH] IN BLOOD BY AUTOMATED COUNT: 13.2 % (ref 11.9–14.6)
GLUCOSE SERPL-MCNC: 144 MG/DL (ref 65–100)
HCT VFR BLD AUTO: 43.8 % (ref 41.1–50.3)
HGB BLD-MCNC: 14.7 G/DL (ref 13.6–17.2)
MAGNESIUM SERPL-MCNC: 2.4 MG/DL (ref 1.8–2.4)
MCH RBC QN AUTO: 28.4 PG (ref 26.1–32.9)
MCHC RBC AUTO-ENTMCNC: 33.6 G/DL (ref 31.4–35)
MCV RBC AUTO: 84.6 FL (ref 82–102)
NRBC # BLD: 0 K/UL (ref 0–0.2)
PLATELET # BLD AUTO: 218 K/UL (ref 150–450)
PMV BLD AUTO: 9.6 FL (ref 9.4–12.3)
POTASSIUM SERPL-SCNC: 4 MMOL/L (ref 3.5–5.1)
RBC # BLD AUTO: 5.18 M/UL (ref 4.23–5.6)
SODIUM SERPL-SCNC: 139 MMOL/L (ref 136–146)
WBC # BLD AUTO: 10.4 K/UL (ref 4.3–11.1)

## 2023-12-14 PROCEDURE — 2580000003 HC RX 258: Performed by: INTERNAL MEDICINE

## 2023-12-14 PROCEDURE — 6370000000 HC RX 637 (ALT 250 FOR IP): Performed by: INTERNAL MEDICINE

## 2023-12-14 PROCEDURE — 2580000003 HC RX 258

## 2023-12-14 PROCEDURE — 85027 COMPLETE CBC AUTOMATED: CPT

## 2023-12-14 PROCEDURE — 83735 ASSAY OF MAGNESIUM: CPT

## 2023-12-14 PROCEDURE — 6370000000 HC RX 637 (ALT 250 FOR IP)

## 2023-12-14 PROCEDURE — G0378 HOSPITAL OBSERVATION PER HR: HCPCS

## 2023-12-14 PROCEDURE — 80048 BASIC METABOLIC PNL TOTAL CA: CPT

## 2023-12-14 PROCEDURE — 36415 COLL VENOUS BLD VENIPUNCTURE: CPT

## 2023-12-14 RX ORDER — ASPIRIN 81 MG/1
81 TABLET ORAL DAILY
Qty: 30 TABLET | Refills: 3 | Status: SHIPPED | OUTPATIENT
Start: 2023-12-14

## 2023-12-14 RX ORDER — ASPIRIN 81 MG/1
81 TABLET ORAL DAILY
Qty: 30 TABLET | Refills: 3 | Status: SHIPPED | OUTPATIENT
Start: 2023-12-14 | End: 2023-12-14 | Stop reason: SDUPTHER

## 2023-12-14 RX ADMIN — SODIUM CHLORIDE: 900 INJECTION, SOLUTION INTRAVENOUS at 04:04

## 2023-12-14 RX ADMIN — SODIUM CHLORIDE, PRESERVATIVE FREE 10 ML: 5 INJECTION INTRAVENOUS at 08:59

## 2023-12-14 RX ADMIN — AMLODIPINE BESYLATE 10 MG: 10 TABLET ORAL at 08:58

## 2023-12-14 RX ADMIN — SODIUM CHLORIDE, PRESERVATIVE FREE 10 ML: 5 INJECTION INTRAVENOUS at 09:01

## 2023-12-14 RX ADMIN — CARVEDILOL 3.12 MG: 3.12 TABLET, FILM COATED ORAL at 08:58

## 2023-12-14 RX ADMIN — LOSARTAN POTASSIUM 100 MG: 50 TABLET, FILM COATED ORAL at 10:12

## 2023-12-14 RX ADMIN — TICAGRELOR 90 MG: 90 TABLET ORAL at 08:58

## 2023-12-14 RX ADMIN — ASPIRIN 81 MG: 81 TABLET ORAL at 08:58

## 2023-12-14 ASSESSMENT — PAIN SCALES - GENERAL: PAINLEVEL_OUTOF10: 0

## 2023-12-14 NOTE — PROGRESS NOTES
Cardiac Rehab: Spoke with patient regarding referral to cardiac rehab. Patient meets admission criteria based on PCI (12/13/23). Written information about Cardiac Rehab given and reviewed with patient. Discussed lifestyle modifications to promote cardiac wellness. Patient indicated that he does not want to participate in the cardiac rehab program due to needed staged PCI. His Cardiologist is Dr. Cindy Koehler.       Thank you,  ALEXANDRO CaN, RN  Cardiopulmonary Rehabilitation Nurse Liaison  [facility]

## 2023-12-14 NOTE — PLAN OF CARE
Problem: Chronic Conditions and Co-morbidities  Goal: Patient's chronic conditions and co-morbidity symptoms are monitored and maintained or improved  Outcome: Adequate for Discharge  Flowsheets (Taken 12/13/2023 2023 by Mayra Weber RN)  Care Plan - Patient's Chronic Conditions and Co-Morbidity Symptoms are Monitored and Maintained or Improved:   Monitor and assess patient's chronic conditions and comorbid symptoms for stability, deterioration, or improvement   Collaborate with multidisciplinary team to address chronic and comorbid conditions and prevent exacerbation or deterioration   Update acute care plan with appropriate goals if chronic or comorbid symptoms are exacerbated and prevent overall improvement and discharge     Problem: Pain  Goal: Verbalizes/displays adequate comfort level or baseline comfort level  Outcome: Adequate for Discharge     Problem: Discharge Planning  Goal: Discharge to home or other facility with appropriate resources  Outcome: Adequate for Discharge  Flowsheets (Taken 12/13/2023 2023 by Mayra Weber RN)  Discharge to home or other facility with appropriate resources:   Identify barriers to discharge with patient and caregiver   Arrange for needed discharge resources and transportation as appropriate   Identify discharge learning needs (meds, wound care, etc)     Problem: ABCDS Injury Assessment  Goal: Absence of physical injury  Outcome: Adequate for Discharge     Problem: Safety - Adult  Goal: Free from fall injury  Outcome: Adequate for Discharge Yes

## 2023-12-14 NOTE — DISCHARGE SUMMARY
been seen by Dr. Chi Pulse: see his progress note for exam details.     Recent Results (from the past 24 hour(s))   POCT activated clotting time    Collection Time: 12/13/23 12:02 PM   Result Value Ref Range    Activated Clotting Time 228 (H) 70 - 128 SECS   POCT activated clotting time    Collection Time: 12/13/23 12:31 PM   Result Value Ref Range    Activated Clotting Time 260 (H) 70 - 128 SECS   Cardiac procedure    Collection Time: 12/13/23 12:34 PM   Result Value Ref Range    Body Surface Area 2.56 m2   EKG 12 lead    Collection Time: 12/13/23  2:37 PM   Result Value Ref Range    Ventricular Rate 50 BPM    Atrial Rate 50 BPM    P-R Interval 202 ms    QRS Duration 110 ms    Q-T Interval 450 ms    QTc Calculation (Bazett) 410 ms    P Axis 46 degrees    R Axis -45 degrees    T Axis 65 degrees    Diagnosis       Sinus bradycardia  Left anterior fascicular block  Abnormal ECG  When compared with ECG of 13-DEC-2023 07:00,  No significant change was found  Confirmed by MD RUBI, AILEEN ([id number]) on 12/13/2023 3:43:41 PM     Creatinine    Collection Time: 12/13/23  4:29 PM   Result Value Ref Range    Creatinine 1.30 0.8 - 1.5 MG/DL   CBC    Collection Time: 12/14/23  8:21 AM   Result Value Ref Range    WBC 10.4 4.3 - 11.1 K/uL    RBC 5.18 4.23 - 5.6 M/uL    Hemoglobin 14.7 13.6 - 17.2 g/dL    Hematocrit 43.8 41.1 - 50.3 %    MCV 84.6 82 - 102 FL    MCH 28.4 26.1 - 32.9 PG    MCHC 33.6 31.4 - 35.0 g/dL    RDW 13.2 11.9 - 14.6 %    Platelets 036 153 - 598 K/uL    MPV 9.6 9.4 - 12.3 FL    nRBC 0.00 0.0 - 0.2 K/uL   Basic Metabolic Panel    Collection Time: 12/14/23  8:21 AM   Result Value Ref Range    Sodium 139 136 - 146 mmol/L    Potassium 4.0 3.5 - 5.1 mmol/L    Chloride 110 103 - 113 mmol/L    CO2 24 21 - 32 mmol/L    Anion Gap 5 2 - 11 mmol/L    Glucose 144 (H) 65 - 100 mg/dL    BUN 14 8 - 23 MG/DL    Creatinine 1.30 0.8 - 1.5 MG/DL    Est, Glom Filt Rate 60 (L) >60 ml/min/1.73m2    Calcium 8.5 8.3 - 10.4 MG/DL

## 2023-12-15 ENCOUNTER — CARE COORDINATION (OUTPATIENT)
Dept: CARE COORDINATION | Facility: CLINIC | Age: 68
End: 2023-12-15

## 2023-12-15 NOTE — CARE COORDINATION
without hematoma or bruit. Patient was seen and examined by Dr. Ramandeep Hussein and determined stable and ready for discharge. Patient was instructed on the importance of medication compliance including taking aspirin and Brilinta everyday without missing a dose. After receiving drug eluting stents, the patient will remain on dual anti-platelet therapy for 1 year. For maximized medical therapy for CAD, patient will continue dual anti-platelet therapy, BB, ARB, & prn nitro. He is intolerant to statins and is on Zetia. Will defer repatha/leqvio to outpatient discussion. BMP ordered for 1 week. The patient will follow up with [facility] Dr Jacqueline Klein 1/9/24 at [address] [place] [address] and has been referred to cardiac rehab. Plan on repeat cath with work on RCA in 1-2 weeks after discharge. DISPOSITION: The patient is being discharged home in stable condition on a low saturated fat, low cholesterol and low salt diet. The patient is instructed to advance activities as tolerated to the limit of fatigue or shortness of breath. The patient is instructed to avoid all heavy lifting for 5 days. The patient is instructed to watch the cath site for bleeding/oozing; if seen, the patient is instructed to apply firm pressure with a clean cloth and call [facility] at [phone]. The patient is instructed to watch for signs of infection which include: increasing area of redness, fever/hot to touch or purulent drainage at the catheterization site. The patient is instructed not to soak in a bathtub for 7-10 days, but is cleared to shower. The patient is instructed to call the office or return to the ER for immediate evaluation for any shortness of breath or chest pain not relieved by NTG.    [facility] outreach initial call. Left message, identified self, reason for call, return call contact information, Crystal Ann LPN [id number] [phone].

## 2023-12-18 ENCOUNTER — TELEPHONE (OUTPATIENT)
Age: 68
End: 2023-12-18

## 2023-12-18 NOTE — TELEPHONE ENCOUNTER
Pt wife Laine states that pt is to have a 3rd stent placed they could not do it on 12/13/2023 when he had his cath done and wants to know when that would be scheduled. Pt would appreciate a call back

## 2023-12-26 NOTE — PROGRESS NOTES
Attempted to call for pre-procedure call. No answer left message with instructions to take ASA 81 mg x 4 and Brilinta before arrival along with all other am prescription medications while HOLDING all vitamins and supplements. To arrive at 0730 for [facility] on 12/27/2023.

## 2023-12-27 ENCOUNTER — HOSPITAL ENCOUNTER (OUTPATIENT)
Age: 68
Setting detail: OUTPATIENT SURGERY
Discharge: HOME | End: 2023-12-27
Attending: INTERNAL MEDICINE | Admitting: INTERNAL MEDICINE
Payer: MEDICARE

## 2023-12-27 VITALS
SYSTOLIC BLOOD PRESSURE: 145 MMHG | BODY MASS INDEX: 41.52 KG/M2 | OXYGEN SATURATION: 97 % | HEIGHT: 70 IN | HEART RATE: 63 BPM | WEIGHT: 290 LBS | DIASTOLIC BLOOD PRESSURE: 71 MMHG | RESPIRATION RATE: 16 BRPM

## 2023-12-27 DIAGNOSIS — Z98.61 CAD S/P PERCUTANEOUS CORONARY ANGIOPLASTY: Primary | ICD-10-CM

## 2023-12-27 DIAGNOSIS — I25.10 CAD (CORONARY ARTERY DISEASE): ICD-10-CM

## 2023-12-27 DIAGNOSIS — I25.118 CORONARY ARTERY DISEASE OF NATIVE ARTERY OF NATIVE HEART WITH STABLE ANGINA PECTORIS (HCC): ICD-10-CM

## 2023-12-27 DIAGNOSIS — I25.10 CAD S/P PERCUTANEOUS CORONARY ANGIOPLASTY: Primary | ICD-10-CM

## 2023-12-27 LAB
ACT BLD: 255 SECS (ref 70–128)
ACT BLD: 536 SECS (ref 70–128)
CREAT BLD-MCNC: 1.09 MG/DL (ref 0.8–1.5)
ECHO BSA: 2.55 M2
EKG ATRIAL RATE: 53 BPM
EKG ATRIAL RATE: 60 BPM
EKG ATRIAL RATE: 64 BPM
EKG DIAGNOSIS: NORMAL
EKG P AXIS: 54 DEGREES
EKG P AXIS: 55 DEGREES
EKG P AXIS: 57 DEGREES
EKG P-R INTERVAL: 190 MS
EKG P-R INTERVAL: 198 MS
EKG P-R INTERVAL: 212 MS
EKG Q-T INTERVAL: 430 MS
EKG Q-T INTERVAL: 442 MS
EKG Q-T INTERVAL: 446 MS
EKG QRS DURATION: 114 MS
EKG QTC CALCULATION (BAZETT): 414 MS
EKG QTC CALCULATION (BAZETT): 443 MS
EKG QTC CALCULATION (BAZETT): 446 MS
EKG R AXIS: -11 DEGREES
EKG R AXIS: -16 DEGREES
EKG R AXIS: -58 DEGREES
EKG T AXIS: 74 DEGREES
EKG T AXIS: 78 DEGREES
EKG T AXIS: 79 DEGREES
EKG VENTRICULAR RATE: 53 BPM
EKG VENTRICULAR RATE: 60 BPM
EKG VENTRICULAR RATE: 64 BPM
POTASSIUM BLD-SCNC: 4 MMOL/L (ref 3.5–5.1)

## 2023-12-27 PROCEDURE — 93458 L HRT ARTERY/VENTRICLE ANGIO: CPT | Performed by: INTERNAL MEDICINE

## 2023-12-27 PROCEDURE — 2500000003 HC RX 250 WO HCPCS: Performed by: INTERNAL MEDICINE

## 2023-12-27 PROCEDURE — 2580000003 HC RX 258: Performed by: INTERNAL MEDICINE

## 2023-12-27 PROCEDURE — 85347 COAGULATION TIME ACTIVATED: CPT

## 2023-12-27 PROCEDURE — 99152 MOD SED SAME PHYS/QHP 5/>YRS: CPT | Performed by: INTERNAL MEDICINE

## 2023-12-27 PROCEDURE — 84132 ASSAY OF SERUM POTASSIUM: CPT

## 2023-12-27 PROCEDURE — 92978 ENDOLUMINL IVUS OCT C 1ST: CPT | Performed by: INTERNAL MEDICINE

## 2023-12-27 PROCEDURE — C9600 PERC DRUG-EL COR STENT SING: HCPCS | Performed by: INTERNAL MEDICINE

## 2023-12-27 PROCEDURE — C1887 CATHETER, GUIDING: HCPCS | Performed by: INTERNAL MEDICINE

## 2023-12-27 PROCEDURE — 6360000004 HC RX CONTRAST MEDICATION: Performed by: INTERNAL MEDICINE

## 2023-12-27 PROCEDURE — 99153 MOD SED SAME PHYS/QHP EA: CPT | Performed by: INTERNAL MEDICINE

## 2023-12-27 PROCEDURE — C1753 CATH, INTRAVAS ULTRASOUND: HCPCS | Performed by: INTERNAL MEDICINE

## 2023-12-27 PROCEDURE — C1894 INTRO/SHEATH, NON-LASER: HCPCS | Performed by: INTERNAL MEDICINE

## 2023-12-27 PROCEDURE — 82565 ASSAY OF CREATININE: CPT

## 2023-12-27 PROCEDURE — 6370000000 HC RX 637 (ALT 250 FOR IP): Performed by: INTERNAL MEDICINE

## 2023-12-27 PROCEDURE — C1761 HC CATH TRANSLUM INTRAVASCULAR LITHOTRIPSY CORONARY: HCPCS | Performed by: INTERNAL MEDICINE

## 2023-12-27 PROCEDURE — 2709999900 HC NON-CHARGEABLE SUPPLY: Performed by: INTERNAL MEDICINE

## 2023-12-27 PROCEDURE — C1769 GUIDE WIRE: HCPCS | Performed by: INTERNAL MEDICINE

## 2023-12-27 PROCEDURE — 93010 ELECTROCARDIOGRAM REPORT: CPT | Performed by: INTERNAL MEDICINE

## 2023-12-27 PROCEDURE — C1725 CATH, TRANSLUMIN NON-LASER: HCPCS | Performed by: INTERNAL MEDICINE

## 2023-12-27 PROCEDURE — C1874 STENT, COATED/COV W/DEL SYS: HCPCS | Performed by: INTERNAL MEDICINE

## 2023-12-27 PROCEDURE — 6360000002 HC RX W HCPCS: Performed by: INTERNAL MEDICINE

## 2023-12-27 PROCEDURE — 92928 PRQ TCAT PLMT NTRAC ST 1 LES: CPT | Performed by: INTERNAL MEDICINE

## 2023-12-27 PROCEDURE — 93005 ELECTROCARDIOGRAM TRACING: CPT | Performed by: INTERNAL MEDICINE

## 2023-12-27 DEVICE — STENT ONYXNG40022UX ONYX 4.00X22RX
Type: IMPLANTABLE DEVICE | Status: FUNCTIONAL
Brand: ONYX FRONTIER™

## 2023-12-27 DEVICE — STENT ONYXNG50018UX ONYX 5.00X18RX
Type: IMPLANTABLE DEVICE | Status: FUNCTIONAL
Brand: ONYX FRONTIER™

## 2023-12-27 RX ORDER — TRAMADOL HYDROCHLORIDE 50 MG/1
50 TABLET ORAL EVERY 6 HOURS PRN
Status: DISCONTINUED | OUTPATIENT
Start: 2023-12-27 | End: 2023-12-27 | Stop reason: HOSPADM

## 2023-12-27 RX ORDER — HEPARIN SODIUM 200 [USP'U]/100ML
INJECTION, SOLUTION INTRAVENOUS CONTINUOUS PRN
Status: DISCONTINUED | OUTPATIENT
Start: 2023-12-27 | End: 2023-12-27 | Stop reason: HOSPADM

## 2023-12-27 RX ORDER — TRAMADOL HYDROCHLORIDE 50 MG/1
100 TABLET ORAL EVERY 6 HOURS PRN
Status: DISCONTINUED | OUTPATIENT
Start: 2023-12-27 | End: 2023-12-27 | Stop reason: HOSPADM

## 2023-12-27 RX ORDER — MORPHINE SULFATE 10 MG/ML
2 INJECTION, SOLUTION INTRAMUSCULAR; INTRAVENOUS
Status: DISCONTINUED | OUTPATIENT
Start: 2023-12-27 | End: 2023-12-27 | Stop reason: HOSPADM

## 2023-12-27 RX ORDER — SODIUM CHLORIDE 9 MG/ML
INJECTION, SOLUTION INTRAVENOUS CONTINUOUS
Status: DISCONTINUED | OUTPATIENT
Start: 2023-12-27 | End: 2023-12-27 | Stop reason: HOSPADM

## 2023-12-27 RX ORDER — SODIUM CHLORIDE 9 MG/ML
INJECTION, SOLUTION INTRAVENOUS PRN
Status: DISCONTINUED | OUTPATIENT
Start: 2023-12-27 | End: 2023-12-27 | Stop reason: HOSPADM

## 2023-12-27 RX ORDER — LIDOCAINE HYDROCHLORIDE 10 MG/ML
INJECTION, SOLUTION INFILTRATION; PERINEURAL PRN
Status: DISCONTINUED | OUTPATIENT
Start: 2023-12-27 | End: 2023-12-27 | Stop reason: HOSPADM

## 2023-12-27 RX ORDER — SODIUM CHLORIDE 0.9 % (FLUSH) 0.9 %
5-40 SYRINGE (ML) INJECTION EVERY 12 HOURS SCHEDULED
Status: DISCONTINUED | OUTPATIENT
Start: 2023-12-27 | End: 2023-12-27 | Stop reason: HOSPADM

## 2023-12-27 RX ORDER — HEPARIN SODIUM 10000 [USP'U]/ML
INJECTION, SOLUTION INTRAVENOUS; SUBCUTANEOUS PRN
Status: DISCONTINUED | OUTPATIENT
Start: 2023-12-27 | End: 2023-12-27 | Stop reason: HOSPADM

## 2023-12-27 RX ORDER — CLOPIDOGREL BISULFATE 75 MG/1
75 TABLET ORAL DAILY
Qty: 90 TABLET | Refills: 3 | Status: SHIPPED | OUTPATIENT
Start: 2023-12-27

## 2023-12-27 RX ORDER — CLOPIDOGREL 300 MG/1
300 TABLET, FILM COATED ORAL ONCE
Qty: 1 TABLET | Refills: 0 | Status: SHIPPED | OUTPATIENT
Start: 2023-12-28 | End: 2023-12-28

## 2023-12-27 RX ORDER — ATROPINE SULFATE 0.4 MG/ML
0.5 INJECTION, SOLUTION ENDOTRACHEAL; INTRAMEDULLARY; INTRAMUSCULAR; INTRAVENOUS; SUBCUTANEOUS
Status: DISCONTINUED | OUTPATIENT
Start: 2023-12-27 | End: 2023-12-27 | Stop reason: HOSPADM

## 2023-12-27 RX ORDER — MORPHINE SULFATE 10 MG/ML
4 INJECTION, SOLUTION INTRAMUSCULAR; INTRAVENOUS
Status: DISCONTINUED | OUTPATIENT
Start: 2023-12-27 | End: 2023-12-27 | Stop reason: HOSPADM

## 2023-12-27 RX ORDER — MORPHINE SULFATE 2 MG/ML
2 INJECTION, SOLUTION INTRAMUSCULAR; INTRAVENOUS
Status: COMPLETED | OUTPATIENT
Start: 2023-12-27 | End: 2023-12-27

## 2023-12-27 RX ORDER — MIDAZOLAM HYDROCHLORIDE 1 MG/ML
INJECTION INTRAMUSCULAR; INTRAVENOUS PRN
Status: DISCONTINUED | OUTPATIENT
Start: 2023-12-27 | End: 2023-12-27 | Stop reason: HOSPADM

## 2023-12-27 RX ORDER — SODIUM CHLORIDE 0.9 % (FLUSH) 0.9 %
5-40 SYRINGE (ML) INJECTION PRN
Status: DISCONTINUED | OUTPATIENT
Start: 2023-12-27 | End: 2023-12-27 | Stop reason: HOSPADM

## 2023-12-27 RX ORDER — ASPIRIN 81 MG/1
324 TABLET, CHEWABLE ORAL ONCE
Status: DISCONTINUED | OUTPATIENT
Start: 2023-12-27 | End: 2023-12-27 | Stop reason: HOSPADM

## 2023-12-27 RX ORDER — ACETAMINOPHEN 325 MG/1
650 TABLET ORAL EVERY 4 HOURS PRN
Status: DISCONTINUED | OUTPATIENT
Start: 2023-12-27 | End: 2023-12-27 | Stop reason: HOSPADM

## 2023-12-27 RX ORDER — 0.9 % SODIUM CHLORIDE 0.9 %
500 INTRAVENOUS SOLUTION INTRAVENOUS PRN
Status: DISCONTINUED | OUTPATIENT
Start: 2023-12-27 | End: 2023-12-27 | Stop reason: HOSPADM

## 2023-12-27 RX ADMIN — SODIUM CHLORIDE: 9 INJECTION, SOLUTION INTRAVENOUS at 08:30

## 2023-12-27 RX ADMIN — MORPHINE SULFATE 2 MG: 2 INJECTION, SOLUTION INTRAMUSCULAR; INTRAVENOUS at 11:12

## 2023-12-27 NOTE — PROGRESS NOTES
TRANSFER - OUT REPORT:    Verbal report given to RN on Meghan Benitez  being transferred to [facility] for routine progression of patient care       Report consisted of patient's Situation, Background, Assessment and   Recommendations(SBAR). Information from the following report(s) Nurse Handoff Report and MAR was reviewed with the receiving nurse.       PCI w/ Dr. Malcom Kaur  Two stents to RCA  R radial access  TR band to R radial @ 12mL  No s/sxs of bleeding or hematoma to R radial access site    Heparin 12,000 units   Versed 6mg IV  Fentanyl 75mcg IV  Brilinta 90mg po

## 2023-12-27 NOTE — DISCHARGE INSTRUCTIONS
[facility]. Click Sign Up. You can now view and download portions of your medical record. Click the [employer] link to download a portable copy of your medical information. Additional Information    If you have questions, please visit the Frequently Asked Questions section of the [facility] website at [email or web address] Remember, [facility] is NOT to be used for urgent needs. For medical emergencies, dial 911.

## 2023-12-27 NOTE — PROGRESS NOTES
Patient received to [facility] room # 10  Ambulatory from [facility]. Patient scheduled for PCI today with Dr peña. Procedure reviewed & questions answered, voiced good understanding consent obtained & placed on chart. All medications and medical history reviewed. Will prep patient per orders. Patient & family updated on plan of care. The patient has a fraility score of 3-MANAGING WELL, based on ambulation without assistance. Patient took Aspirin 324mg today at 0600 prior to arrival.

## 2023-12-27 NOTE — PROGRESS NOTES
Patient complaining of chest pain rate 4/5 out of 10 on pain scale, stat EKG ordered no changes noted on telemetry, Patient SR on monitor. Patient states its less than it was during the procedure. Dr. Jose Barreto by to see patient and he states that he had to do a lot of stretching. Morphine 2 mg IVP given see mar.

## 2023-12-27 NOTE — PROGRESS NOTES
EKG completed. No changes noted. Discharge Same Day as PCI Teaching    Discharge teaching completed. Patient instructed on radial site care, including symptoms to report to MD, medication changes & Follow up Care to include:    Patient is being treated with 2 separate anti-platelet medications including aspirin 81 & Plavix 75 mg. It is very important that these medications are filled today started. Patient instructed on the importance of these medications & that these medications must not be stopped for any reason or without talking to the doctor first.   Patient is also being discharged on a medication for cholesterol management (ie-statin) zetia and instructed on the importance of continuing this medication as well. Patient received a referral for Cardiac Rehab II, contact information was faxed to Cardiac rehab & patient given telephone number to contact ([phone]) Cardiac Rehab if they have not heard from them within 10 days.

## 2023-12-27 NOTE — PROGRESS NOTES
Report received from [facility]. Procedural findings communicated. Intra procedural  medication administration reviewed. Progression of care discussed.      Patient received into [address] [place] [address] 4 post sheath removal.     Access site without bleeding or swelling yes    Dressing dry and intact yes    Patient instructed to limit movement to right radial extremity    Routine post procedural vital signs and site assessment initiated yes

## 2023-12-28 NOTE — PROGRESS NOTES
Call placed to follow up after same day discharge following PCI - No answer - message left inquiring about chest pain - shortness of breath- right radial site & reinforced the importance of continuing to take both asa & plavix daily & to not stop for any reason without discussing with the cardiologist first. Left my number for return call

## 2024-01-04 ENCOUNTER — TELEPHONE (OUTPATIENT)
Dept: INTERNAL MEDICINE CLINIC | Facility: CLINIC | Age: 69
End: 2024-01-04

## 2024-01-04 ENCOUNTER — TELEPHONE (OUTPATIENT)
Age: 69
End: 2024-01-04

## 2024-01-04 DIAGNOSIS — G47.33 OSA (OBSTRUCTIVE SLEEP APNEA): Primary | ICD-10-CM

## 2024-01-04 NOTE — TELEPHONE ENCOUNTER
Patient needs a prescription for updated c-pap equipment.  He has model number.  Number to call to initiate prescription [phone].

## 2024-01-04 NOTE — TELEPHONE ENCOUNTER
Patient is requesting Cpap machine w supplies. Last sleep study is 05/20019    Requesting order fax to [facility]  [phone].    Order faxed.  Wakie message sent to patient.

## 2024-01-04 NOTE — TELEPHONE ENCOUNTER
Called pt, left a message on pt's dedicated VM to make aware this office does not prescribe CPAP equipment. Recommend calling sleep provider or PCP. Request call back only if questions.

## 2024-01-05 ENCOUNTER — TELEPHONE (OUTPATIENT)
Dept: INTERNAL MEDICINE CLINIC | Facility: CLINIC | Age: 69
End: 2024-01-05

## 2024-01-05 NOTE — TELEPHONE ENCOUNTER
[facility] called stating that they don't accept patient's insurance and suggested to send order for Cpap supply to [employer].     Refaxed order to [facility] [phone]

## 2024-01-08 NOTE — PROGRESS NOTES
[facility] CARDIOLOGY Follow Up                 Reason for Visit: Stable ischemic heart disease    Subjective:     Patient is a 68 y.o. male with a PMH of CAD status post PCI, asymptomatic right ICA stenosis, hypertension, hyperlipidemia, and statin intolerance who presents for follow-up.  The patient was last seen in December 2023.  An C was pursued in the setting of an abnormal cardiovascular stress test.  The patient underwent IVUS guided reconstruction of a heavily stented RCA with 2 new stents in the mid vessel and 1 new stent in the proximal vessel.  Right PDA stenosis was treated with coronary lithotripsy without complete resolution.  Stents of the left-sided system were noted to be patent.  The patient had a TTE in March 2023 that was noted to demonstrate a normal EF.  He says his angina has improved.  He reports chronic ENGEL.      Past Medical History:   Diagnosis Date    Coronary artery disease     S/P MI / stents.    Diabetes mellitus, type 2 (HCC)     Hyperlipidemia associated with type 2 diabetes mellitus (HCC)     Hypertension, essential     Morbid obesity (HCC)     Statin intolerance       Past Surgical History:   Procedure Laterality Date    CARDIAC PROCEDURE N/A 12/13/2023    Left heart cath / coronary angiography performed by Brennan Cam MD at [facility] CARDIAC CATH LAB    CARDIAC PROCEDURE N/A 12/13/2023    Intravascular ultrasound performed by Brennan Cam MD at [facility] CARDIAC CATH LAB    CARDIAC PROCEDURE N/A 12/13/2023    Percutaneous coronary intervention performed by Brennan aCm MD at [facility] CARDIAC CATH LAB    CARDIAC PROCEDURE N/A 12/27/2023    Percutaneous coronary intervention performed by Brennan Cam MD at [facility] CARDIAC CATH LAB    CARDIAC PROCEDURE N/A 12/27/2023    Intravascular ultrasound performed by Brennan Cam MD at [facility] CARDIAC CATH LAB    CARDIAC PROCEDURE N/A 12/27/2023    Left heart cath / coronary angiography performed by Brennan Cam MD at [facility] CARDIAC CATH LAB

## 2024-01-09 ENCOUNTER — TELEPHONE (OUTPATIENT)
Age: 69
End: 2024-01-09

## 2024-01-09 ENCOUNTER — OFFICE VISIT (OUTPATIENT)
Age: 69
End: 2024-01-09
Payer: MEDICARE

## 2024-01-09 VITALS
HEART RATE: 62 BPM | DIASTOLIC BLOOD PRESSURE: 82 MMHG | BODY MASS INDEX: 41.23 KG/M2 | SYSTOLIC BLOOD PRESSURE: 138 MMHG | WEIGHT: 288 LBS | HEIGHT: 70 IN

## 2024-01-09 DIAGNOSIS — E78.5 HYPERLIPIDEMIA, UNSPECIFIED HYPERLIPIDEMIA TYPE: ICD-10-CM

## 2024-01-09 DIAGNOSIS — I25.10 CAD IN NATIVE ARTERY: Primary | ICD-10-CM

## 2024-01-09 DIAGNOSIS — I10 HYPERTENSION, UNSPECIFIED TYPE: ICD-10-CM

## 2024-01-09 DIAGNOSIS — I65.21 ASYMPTOMATIC STENOSIS OF RIGHT CAROTID ARTERY: ICD-10-CM

## 2024-01-09 DIAGNOSIS — R06.09 CHRONIC DYSPNEA: ICD-10-CM

## 2024-01-09 PROCEDURE — 1123F ACP DISCUSS/DSCN MKR DOCD: CPT | Performed by: INTERNAL MEDICINE

## 2024-01-09 PROCEDURE — 3075F SYST BP GE 130 - 139MM HG: CPT | Performed by: INTERNAL MEDICINE

## 2024-01-09 PROCEDURE — 99214 OFFICE O/P EST MOD 30 MIN: CPT | Performed by: INTERNAL MEDICINE

## 2024-01-09 PROCEDURE — G8484 FLU IMMUNIZE NO ADMIN: HCPCS | Performed by: INTERNAL MEDICINE

## 2024-01-09 PROCEDURE — G8427 DOCREV CUR MEDS BY ELIG CLIN: HCPCS | Performed by: INTERNAL MEDICINE

## 2024-01-09 PROCEDURE — 3079F DIAST BP 80-89 MM HG: CPT | Performed by: INTERNAL MEDICINE

## 2024-01-09 PROCEDURE — G8417 CALC BMI ABV UP PARAM F/U: HCPCS | Performed by: INTERNAL MEDICINE

## 2024-01-09 PROCEDURE — 1036F TOBACCO NON-USER: CPT | Performed by: INTERNAL MEDICINE

## 2024-01-09 PROCEDURE — 3017F COLORECTAL CA SCREEN DOC REV: CPT | Performed by: INTERNAL MEDICINE

## 2024-01-09 NOTE — TELEPHONE ENCOUNTER
Advised patient of Dr. Foreman's response. Patient verbalized understanding. He asks if Dr. Foreman wants to order any other labs recommended by hospital. Did not know what other labs were recommended.

## 2024-01-09 NOTE — TELEPHONE ENCOUNTER
Pt checking out from visit today. When pt was in the hospital they requested a follow up lab panel.  Wants to check homocysteine level as part of the lab work.      Please call pt.

## 2024-01-09 NOTE — TELEPHONE ENCOUNTER
Augusto Foreman MD Keener, Lynn F, RN  Caller: Unspecified (Today,  9:44 AM)  Not at this time

## 2024-01-09 NOTE — TELEPHONE ENCOUNTER
Augusto Foreman MD Keener, Lynn F, RN  Caller: Unspecified (Today,  9:44 AM)  I'm not checking a homocysteine since there is no compelling cardiac indication to do so.  Checking that does not move the needle from a cardiac perspective.

## 2024-01-10 ENCOUNTER — TELEPHONE (OUTPATIENT)
Dept: INTERNAL MEDICINE CLINIC | Facility: CLINIC | Age: 69
End: 2024-01-10

## 2024-01-23 ENCOUNTER — TELEPHONE (OUTPATIENT)
Age: 69
End: 2024-01-23

## 2024-01-23 NOTE — TELEPHONE ENCOUNTER
I called [facility] was transferred to [place] and had to [facility] for [place].    1/24/24 called [facility] to call me

## 2024-01-23 NOTE — TELEPHONE ENCOUNTER
Pt returned call. I let pt know that paperwork for handicap placard is at  in [place]. Pt voiced understanding.     Pt stated that he was supposed to have eye surgery with Dr. Castillo at [facility], but it was rescheduled. Pt stated that they said they have reached out to us to get cardiac clearance but hadn't heard back. No telephone encounter for cardiac clearance as of 01/23/24. Let pt know that we would reach out to [facility] about clearance.     [facility]: [phone]

## 2024-01-24 NOTE — TELEPHONE ENCOUNTER
Pt.has to wait 3-6 months post stents before the anesthesiologist will do the eye surgery.They are not waiting for clearance from [facility] are waiting to hear back from anesthesiology.    I called and notified pt.that anesthesiology has not cleared him.

## 2024-01-31 RX ORDER — AMLODIPINE BESYLATE 10 MG/1
10 TABLET ORAL DAILY
Qty: 90 TABLET | Refills: 3 | Status: SHIPPED | OUTPATIENT
Start: 2024-01-31

## 2024-01-31 RX ORDER — NITROGLYCERIN 0.4 MG/1
0.4 TABLET SUBLINGUAL EVERY 5 MIN PRN
Qty: 100 TABLET | Refills: 3 | Status: SHIPPED | OUTPATIENT
Start: 2024-01-31

## 2024-01-31 NOTE — TELEPHONE ENCOUNTER
MEDICATION REFILL REQUEST      Name of Medication:  Nitroglycerin  Dose:  0.4 mg  Frequency:  ?  Quantity:  ?  Days' supply:  ?    Pharmacy Name/Location:  ?    MEDICATION REFILL REQUEST      Name of Medication:  Amlodipine  Dose:  10 mg  Frequency:  QD  Quantity:  ?  Days' supply:  ?      Pharmacy Name/Location:  call pt did not leave a pharmacy

## 2024-01-31 NOTE — TELEPHONE ENCOUNTER
Meds continued 1/9/24 pended as below for refill approval.  Requested Prescriptions     Pending Prescriptions Disp Refills    amLODIPine (NORVASC) 10 MG tablet 90 tablet 3     Sig: Take 1 tablet by mouth daily    nitroGLYCERIN (NITROSTAT) 0.4 MG SL tablet 100 tablet 3     Sig: Place 1 tablet under the tongue every 5 minutes as needed for Chest pain (take one tablet by mouth under tongue every 5 min x 3 as neede for chest pain.If no relief by 3rd dose call 911.)

## 2024-03-19 ENCOUNTER — APPOINTMENT (OUTPATIENT)
Dept: ULTRASOUND IMAGING | Age: 69
DRG: 163 | End: 2024-03-19
Payer: MEDICARE

## 2024-03-19 ENCOUNTER — APPOINTMENT (OUTPATIENT)
Dept: INTERVENTIONAL RADIOLOGY/VASCULAR | Age: 69
DRG: 163 | End: 2024-03-19
Attending: RADIOLOGY
Payer: MEDICARE

## 2024-03-19 ENCOUNTER — APPOINTMENT (OUTPATIENT)
Dept: CT IMAGING | Age: 69
DRG: 163 | End: 2024-03-19
Payer: MEDICARE

## 2024-03-19 ENCOUNTER — HOSPITAL ENCOUNTER (INPATIENT)
Age: 69
LOS: 1 days | Discharge: HOME | DRG: 163 | End: 2024-03-20
Attending: EMERGENCY MEDICINE | Admitting: HOSPITALIST
Payer: MEDICARE

## 2024-03-19 ENCOUNTER — APPOINTMENT (OUTPATIENT)
Dept: GENERAL RADIOLOGY | Age: 69
DRG: 163 | End: 2024-03-19
Payer: MEDICARE

## 2024-03-19 DIAGNOSIS — I26.99 BILATERAL PULMONARY EMBOLISM (HCC): Primary | ICD-10-CM

## 2024-03-19 DIAGNOSIS — R09.02 HYPOXIA: ICD-10-CM

## 2024-03-19 DIAGNOSIS — J18.9 PNEUMONIA DUE TO INFECTIOUS ORGANISM, UNSPECIFIED LATERALITY, UNSPECIFIED PART OF LUNG: ICD-10-CM

## 2024-03-19 DIAGNOSIS — E66.01 MORBID OBESITY WITH BMI OF 40.0-44.9, ADULT (HCC): ICD-10-CM

## 2024-03-19 DIAGNOSIS — J96.01 ACUTE HYPOXEMIC RESPIRATORY FAILURE (HCC): ICD-10-CM

## 2024-03-19 LAB
ALBUMIN SERPL-MCNC: 3.2 G/DL (ref 3.2–4.6)
ALBUMIN/GLOB SERPL: 0.6 (ref 0.4–1.6)
ALP SERPL-CCNC: 76 U/L (ref 50–136)
ALT SERPL-CCNC: 28 U/L (ref 12–65)
ANION GAP SERPL CALC-SCNC: 8 MMOL/L (ref 2–11)
APTT PPP: 198.7 SEC (ref 23.3–37.4)
APTT PPP: >200 SEC (ref 23.3–37.4)
AST SERPL-CCNC: 27 U/L (ref 15–37)
BASOPHILS # BLD: 0.1 K/UL (ref 0–0.2)
BASOPHILS NFR BLD: 1 % (ref 0–2)
BILIRUB SERPL-MCNC: 0.8 MG/DL (ref 0.2–1.1)
BUN SERPL-MCNC: 20 MG/DL (ref 8–23)
CALCIUM SERPL-MCNC: 9.5 MG/DL (ref 8.3–10.4)
CHLORIDE SERPL-SCNC: 107 MMOL/L (ref 103–113)
CO2 SERPL-SCNC: 23 MMOL/L (ref 21–32)
CREAT SERPL-MCNC: 1.5 MG/DL (ref 0.8–1.5)
D DIMER PPP FEU-MCNC: 15.04 UG/ML(FEU)
DIFFERENTIAL METHOD BLD: ABNORMAL
EKG ATRIAL RATE: 92 BPM
EKG DIAGNOSIS: NORMAL
EKG P AXIS: 41 DEGREES
EKG P-R INTERVAL: 168 MS
EKG Q-T INTERVAL: 382 MS
EKG QRS DURATION: 98 MS
EKG QTC CALCULATION (BAZETT): 472 MS
EKG R AXIS: -44 DEGREES
EKG T AXIS: 37 DEGREES
EKG VENTRICULAR RATE: 92 BPM
EOSINOPHIL # BLD: 0.3 K/UL (ref 0–0.8)
EOSINOPHIL NFR BLD: 2 % (ref 0.5–7.8)
ERYTHROCYTE [DISTWIDTH] IN BLOOD BY AUTOMATED COUNT: 13.5 % (ref 11.9–14.6)
ERYTHROCYTE [DISTWIDTH] IN BLOOD BY AUTOMATED COUNT: 13.6 % (ref 11.9–14.6)
GLOBULIN SER CALC-MCNC: 5.4 G/DL (ref 2.8–4.5)
GLUCOSE SERPL-MCNC: 132 MG/DL (ref 65–100)
HCT VFR BLD AUTO: 45.2 % (ref 41.1–50.3)
HCT VFR BLD AUTO: 48.7 % (ref 41.1–50.3)
HGB BLD-MCNC: 15.1 G/DL (ref 13.6–17.2)
HGB BLD-MCNC: 16.2 G/DL (ref 13.6–17.2)
IMM GRANULOCYTES # BLD AUTO: 0 K/UL (ref 0–0.5)
IMM GRANULOCYTES NFR BLD AUTO: 0 % (ref 0–5)
INR PPP: 1.3
INR PPP: 1.4
LACTATE SERPL-SCNC: 1.4 MMOL/L (ref 0.4–2)
LYMPHOCYTES # BLD: 2.3 K/UL (ref 0.5–4.6)
LYMPHOCYTES NFR BLD: 16 % (ref 13–44)
MCH RBC QN AUTO: 28.1 PG (ref 26.1–32.9)
MCH RBC QN AUTO: 28.4 PG (ref 26.1–32.9)
MCHC RBC AUTO-ENTMCNC: 33.3 G/DL (ref 31.4–35)
MCHC RBC AUTO-ENTMCNC: 33.4 G/DL (ref 31.4–35)
MCV RBC AUTO: 84.4 FL (ref 82–102)
MCV RBC AUTO: 85.1 FL (ref 82–102)
MONOCYTES # BLD: 1.3 K/UL (ref 0.1–1.3)
MONOCYTES NFR BLD: 9 % (ref 4–12)
NEUTS SEG # BLD: 10.3 K/UL (ref 1.7–8.2)
NEUTS SEG NFR BLD: 72 % (ref 43–78)
NRBC # BLD: 0 K/UL (ref 0–0.2)
NRBC # BLD: 0 K/UL (ref 0–0.2)
NT PRO BNP: 5183 PG/ML (ref 5–125)
PLATELET # BLD AUTO: 181 K/UL (ref 150–450)
PLATELET # BLD AUTO: 236 K/UL (ref 150–450)
PMV BLD AUTO: 9.6 FL (ref 9.4–12.3)
PMV BLD AUTO: 9.9 FL (ref 9.4–12.3)
POTASSIUM SERPL-SCNC: 3.7 MMOL/L (ref 3.5–5.1)
PROCALCITONIN SERPL-MCNC: <0.05 NG/ML (ref 0–0.49)
PROT SERPL-MCNC: 8.6 G/DL (ref 6.3–8.2)
PROTHROMBIN TIME: 17.1 SEC (ref 11.3–14.9)
PROTHROMBIN TIME: 17.5 SEC (ref 11.3–14.9)
RBC # BLD AUTO: 5.31 M/UL (ref 4.23–5.6)
RBC # BLD AUTO: 5.77 M/UL (ref 4.23–5.6)
SARS-COV-2 RDRP RESP QL NAA+PROBE: NOT DETECTED
SODIUM SERPL-SCNC: 138 MMOL/L (ref 136–146)
SOURCE: NORMAL
TROPONIN I SERPL HS-MCNC: 541.1 PG/ML (ref 0–14)
TROPONIN I SERPL HS-MCNC: 606 PG/ML (ref 0–14)
UFH PPP CHRO-ACNC: >1.1 IU/ML (ref 0.3–0.7)
UFH PPP CHRO-ACNC: >1.1 IU/ML (ref 0.3–0.7)
WBC # BLD AUTO: 14.2 K/UL (ref 4.3–11.1)
WBC # BLD AUTO: 14.4 K/UL (ref 4.3–11.1)

## 2024-03-19 PROCEDURE — 1100000003 HC PRIVATE W/ TELEMETRY

## 2024-03-19 PROCEDURE — 93005 ELECTROCARDIOGRAM TRACING: CPT | Performed by: EMERGENCY MEDICINE

## 2024-03-19 PROCEDURE — 80053 COMPREHEN METABOLIC PANEL: CPT

## 2024-03-19 PROCEDURE — 96375 TX/PRO/DX INJ NEW DRUG ADDON: CPT

## 2024-03-19 PROCEDURE — 85610 PROTHROMBIN TIME: CPT

## 2024-03-19 PROCEDURE — 71045 X-RAY EXAM CHEST 1 VIEW: CPT

## 2024-03-19 PROCEDURE — 02CR3ZZ EXTIRPATION OF MATTER FROM LEFT PULMONARY ARTERY, PERCUTANEOUS APPROACH: ICD-10-PCS | Performed by: RADIOLOGY

## 2024-03-19 PROCEDURE — 85730 THROMBOPLASTIN TIME PARTIAL: CPT

## 2024-03-19 PROCEDURE — 87635 SARS-COV-2 COVID-19 AMP PRB: CPT

## 2024-03-19 PROCEDURE — 36415 COLL VENOUS BLD VENIPUNCTURE: CPT

## 2024-03-19 PROCEDURE — 71260 CT THORAX DX C+: CPT

## 2024-03-19 PROCEDURE — 37187 VENOUS MECH THROMBECTOMY: CPT

## 2024-03-19 PROCEDURE — 6370000000 HC RX 637 (ALT 250 FOR IP): Performed by: EMERGENCY MEDICINE

## 2024-03-19 PROCEDURE — 99285 EMERGENCY DEPT VISIT HI MDM: CPT

## 2024-03-19 PROCEDURE — 6370000000 HC RX 637 (ALT 250 FOR IP): Performed by: HOSPITALIST

## 2024-03-19 PROCEDURE — 84484 ASSAY OF TROPONIN QUANT: CPT

## 2024-03-19 PROCEDURE — 96365 THER/PROPH/DIAG IV INF INIT: CPT

## 2024-03-19 PROCEDURE — 84145 PROCALCITONIN (PCT): CPT

## 2024-03-19 PROCEDURE — 94760 N-INVAS EAR/PLS OXIMETRY 1: CPT

## 2024-03-19 PROCEDURE — 6360000004 HC RX CONTRAST MEDICATION: Performed by: EMERGENCY MEDICINE

## 2024-03-19 PROCEDURE — 85379 FIBRIN DEGRADATION QUANT: CPT

## 2024-03-19 PROCEDURE — 85027 COMPLETE CBC AUTOMATED: CPT

## 2024-03-19 PROCEDURE — 85025 COMPLETE CBC W/AUTO DIFF WBC: CPT

## 2024-03-19 PROCEDURE — 6360000002 HC RX W HCPCS: Performed by: EMERGENCY MEDICINE

## 2024-03-19 PROCEDURE — 6360000004 HC RX CONTRAST MEDICATION: Performed by: RADIOLOGY

## 2024-03-19 PROCEDURE — 76937 US GUIDE VASCULAR ACCESS: CPT

## 2024-03-19 PROCEDURE — 2700000000 HC OXYGEN THERAPY PER DAY

## 2024-03-19 PROCEDURE — 94640 AIRWAY INHALATION TREATMENT: CPT

## 2024-03-19 PROCEDURE — 5A0935A ASSISTANCE WITH RESPIRATORY VENTILATION, LESS THAN 24 CONSECUTIVE HOURS, HIGH NASAL FLOW/VELOCITY: ICD-10-PCS | Performed by: HOSPITALIST

## 2024-03-19 PROCEDURE — 83605 ASSAY OF LACTIC ACID: CPT

## 2024-03-19 PROCEDURE — 83880 ASSAY OF NATRIURETIC PEPTIDE: CPT

## 2024-03-19 PROCEDURE — 02CQ3ZZ EXTIRPATION OF MATTER FROM RIGHT PULMONARY ARTERY, PERCUTANEOUS APPROACH: ICD-10-PCS | Performed by: RADIOLOGY

## 2024-03-19 PROCEDURE — 6360000002 HC RX W HCPCS: Performed by: RADIOLOGY

## 2024-03-19 PROCEDURE — 93010 ELECTROCARDIOGRAM REPORT: CPT | Performed by: INTERNAL MEDICINE

## 2024-03-19 PROCEDURE — 2580000003 HC RX 258: Performed by: EMERGENCY MEDICINE

## 2024-03-19 PROCEDURE — 93970 EXTREMITY STUDY: CPT

## 2024-03-19 PROCEDURE — 85520 HEPARIN ASSAY: CPT

## 2024-03-19 PROCEDURE — 2580000003 HC RX 258: Performed by: HOSPITALIST

## 2024-03-19 RX ORDER — 0.9 % SODIUM CHLORIDE 0.9 %
500 INTRAVENOUS SOLUTION INTRAVENOUS ONCE
Status: COMPLETED | OUTPATIENT
Start: 2024-03-19 | End: 2024-03-19

## 2024-03-19 RX ORDER — ACETAMINOPHEN 325 MG/1
650 TABLET ORAL EVERY 6 HOURS PRN
Status: DISCONTINUED | OUTPATIENT
Start: 2024-03-19 | End: 2024-03-20 | Stop reason: HOSPADM

## 2024-03-19 RX ORDER — HEPARIN SODIUM 1000 [USP'U]/ML
5000 INJECTION, SOLUTION INTRAVENOUS; SUBCUTANEOUS PRN
Status: DISCONTINUED | OUTPATIENT
Start: 2024-03-19 | End: 2024-03-19 | Stop reason: SDUPTHER

## 2024-03-19 RX ORDER — ASPIRIN 325 MG
325 TABLET ORAL
Status: COMPLETED | OUTPATIENT
Start: 2024-03-19 | End: 2024-03-19

## 2024-03-19 RX ORDER — POTASSIUM CHLORIDE 7.45 MG/ML
10 INJECTION INTRAVENOUS PRN
Status: DISCONTINUED | OUTPATIENT
Start: 2024-03-19 | End: 2024-03-20 | Stop reason: HOSPADM

## 2024-03-19 RX ORDER — HEPARIN SODIUM 10000 [USP'U]/100ML
5-30 INJECTION, SOLUTION INTRAVENOUS CONTINUOUS
Status: DISCONTINUED | OUTPATIENT
Start: 2024-03-19 | End: 2024-03-19 | Stop reason: SDUPTHER

## 2024-03-19 RX ORDER — FENTANYL CITRATE 50 UG/ML
INJECTION, SOLUTION INTRAMUSCULAR; INTRAVENOUS PRN
Status: COMPLETED | OUTPATIENT
Start: 2024-03-19 | End: 2024-03-19

## 2024-03-19 RX ORDER — CARVEDILOL 6.25 MG/1
3.12 TABLET ORAL DAILY
Status: DISCONTINUED | OUTPATIENT
Start: 2024-03-20 | End: 2024-03-20 | Stop reason: HOSPADM

## 2024-03-19 RX ORDER — SODIUM CHLORIDE 9 MG/ML
INJECTION, SOLUTION INTRAVENOUS CONTINUOUS
Status: DISCONTINUED | OUTPATIENT
Start: 2024-03-19 | End: 2024-03-20 | Stop reason: HOSPADM

## 2024-03-19 RX ORDER — HYDRALAZINE HYDROCHLORIDE 20 MG/ML
10 INJECTION INTRAMUSCULAR; INTRAVENOUS EVERY 6 HOURS PRN
Status: DISCONTINUED | OUTPATIENT
Start: 2024-03-19 | End: 2024-03-20 | Stop reason: HOSPADM

## 2024-03-19 RX ORDER — ACETAMINOPHEN 650 MG/1
650 SUPPOSITORY RECTAL EVERY 6 HOURS PRN
Status: DISCONTINUED | OUTPATIENT
Start: 2024-03-19 | End: 2024-03-20 | Stop reason: HOSPADM

## 2024-03-19 RX ORDER — SODIUM CHLORIDE 0.9 % (FLUSH) 0.9 %
5-40 SYRINGE (ML) INJECTION EVERY 12 HOURS SCHEDULED
Status: DISCONTINUED | OUTPATIENT
Start: 2024-03-19 | End: 2024-03-20 | Stop reason: HOSPADM

## 2024-03-19 RX ORDER — SODIUM CHLORIDE 0.9 % (FLUSH) 0.9 %
5-40 SYRINGE (ML) INJECTION PRN
Status: DISCONTINUED | OUTPATIENT
Start: 2024-03-19 | End: 2024-03-20 | Stop reason: HOSPADM

## 2024-03-19 RX ORDER — NITROGLYCERIN 0.4 MG/1
0.4 TABLET SUBLINGUAL EVERY 5 MIN PRN
Status: DISCONTINUED | OUTPATIENT
Start: 2024-03-19 | End: 2024-03-20 | Stop reason: HOSPADM

## 2024-03-19 RX ORDER — ONDANSETRON 2 MG/ML
4 INJECTION INTRAMUSCULAR; INTRAVENOUS EVERY 6 HOURS PRN
Status: DISCONTINUED | OUTPATIENT
Start: 2024-03-19 | End: 2024-03-20 | Stop reason: HOSPADM

## 2024-03-19 RX ORDER — HEPARIN SODIUM 1000 [USP'U]/ML
10000 INJECTION, SOLUTION INTRAVENOUS; SUBCUTANEOUS PRN
Status: DISCONTINUED | OUTPATIENT
Start: 2024-03-19 | End: 2024-03-19 | Stop reason: SDUPTHER

## 2024-03-19 RX ORDER — OXYCODONE HYDROCHLORIDE 5 MG/1
5 TABLET ORAL EVERY 4 HOURS PRN
Status: DISCONTINUED | OUTPATIENT
Start: 2024-03-19 | End: 2024-03-20 | Stop reason: HOSPADM

## 2024-03-19 RX ORDER — ASPIRIN 81 MG/1
81 TABLET ORAL DAILY
Status: DISCONTINUED | OUTPATIENT
Start: 2024-03-20 | End: 2024-03-20 | Stop reason: HOSPADM

## 2024-03-19 RX ORDER — IPRATROPIUM BROMIDE AND ALBUTEROL SULFATE 2.5; .5 MG/3ML; MG/3ML
1 SOLUTION RESPIRATORY (INHALATION)
Status: COMPLETED | OUTPATIENT
Start: 2024-03-19 | End: 2024-03-19

## 2024-03-19 RX ORDER — FUROSEMIDE 10 MG/ML
40 INJECTION INTRAMUSCULAR; INTRAVENOUS ONCE
Status: COMPLETED | OUTPATIENT
Start: 2024-03-19 | End: 2024-03-19

## 2024-03-19 RX ORDER — SODIUM CHLORIDE 9 MG/ML
INJECTION, SOLUTION INTRAVENOUS PRN
Status: DISCONTINUED | OUTPATIENT
Start: 2024-03-19 | End: 2024-03-20 | Stop reason: HOSPADM

## 2024-03-19 RX ORDER — POLYETHYLENE GLYCOL 3350 17 G/17G
17 POWDER, FOR SOLUTION ORAL DAILY PRN
Status: DISCONTINUED | OUTPATIENT
Start: 2024-03-19 | End: 2024-03-20 | Stop reason: HOSPADM

## 2024-03-19 RX ORDER — HEPARIN SODIUM 1000 [USP'U]/ML
10000 INJECTION, SOLUTION INTRAVENOUS; SUBCUTANEOUS PRN
Status: DISCONTINUED | OUTPATIENT
Start: 2024-03-19 | End: 2024-03-20

## 2024-03-19 RX ORDER — MAGNESIUM SULFATE IN WATER 40 MG/ML
2000 INJECTION, SOLUTION INTRAVENOUS PRN
Status: DISCONTINUED | OUTPATIENT
Start: 2024-03-19 | End: 2024-03-20 | Stop reason: HOSPADM

## 2024-03-19 RX ORDER — AMLODIPINE BESYLATE 10 MG/1
10 TABLET ORAL DAILY
Status: DISCONTINUED | OUTPATIENT
Start: 2024-03-20 | End: 2024-03-20 | Stop reason: HOSPADM

## 2024-03-19 RX ORDER — EZETIMIBE 10 MG/1
10 TABLET ORAL NIGHTLY
Status: DISCONTINUED | OUTPATIENT
Start: 2024-03-19 | End: 2024-03-20 | Stop reason: HOSPADM

## 2024-03-19 RX ORDER — MIDAZOLAM HYDROCHLORIDE 1 MG/ML
INJECTION INTRAMUSCULAR; INTRAVENOUS PRN
Status: COMPLETED | OUTPATIENT
Start: 2024-03-19 | End: 2024-03-19

## 2024-03-19 RX ORDER — HEPARIN SODIUM 1000 [USP'U]/ML
5000 INJECTION, SOLUTION INTRAVENOUS; SUBCUTANEOUS PRN
Status: DISCONTINUED | OUTPATIENT
Start: 2024-03-19 | End: 2024-03-20

## 2024-03-19 RX ORDER — HEPARIN SODIUM 1000 [USP'U]/ML
10000 INJECTION, SOLUTION INTRAVENOUS; SUBCUTANEOUS ONCE
Status: COMPLETED | OUTPATIENT
Start: 2024-03-19 | End: 2024-03-19

## 2024-03-19 RX ORDER — ONDANSETRON 4 MG/1
4 TABLET, ORALLY DISINTEGRATING ORAL EVERY 8 HOURS PRN
Status: DISCONTINUED | OUTPATIENT
Start: 2024-03-19 | End: 2024-03-20 | Stop reason: HOSPADM

## 2024-03-19 RX ORDER — HEPARIN SODIUM 10000 [USP'U]/100ML
5-30 INJECTION, SOLUTION INTRAVENOUS CONTINUOUS
Status: DISCONTINUED | OUTPATIENT
Start: 2024-03-19 | End: 2024-03-20

## 2024-03-19 RX ORDER — POTASSIUM CHLORIDE 29.8 MG/ML
20 INJECTION INTRAVENOUS PRN
Status: DISCONTINUED | OUTPATIENT
Start: 2024-03-19 | End: 2024-03-20 | Stop reason: HOSPADM

## 2024-03-19 RX ADMIN — ASPIRIN 325 MG: 325 TABLET, FILM COATED ORAL at 15:38

## 2024-03-19 RX ADMIN — AZITHROMYCIN MONOHYDRATE 500 MG: 500 INJECTION, POWDER, LYOPHILIZED, FOR SOLUTION INTRAVENOUS at 18:33

## 2024-03-19 RX ADMIN — FENTANYL CITRATE 50 MCG: 50 INJECTION, SOLUTION INTRAMUSCULAR; INTRAVENOUS at 19:13

## 2024-03-19 RX ADMIN — HEPARIN SODIUM 10000 UNITS: 1000 INJECTION INTRAVENOUS; SUBCUTANEOUS at 17:31

## 2024-03-19 RX ADMIN — NITROGLYCERIN 0.4 MG: 0.4 TABLET SUBLINGUAL at 17:17

## 2024-03-19 RX ADMIN — SODIUM CHLORIDE, PRESERVATIVE FREE 10 ML: 5 INJECTION INTRAVENOUS at 21:18

## 2024-03-19 RX ADMIN — EZETIMIBE 10 MG: 10 TABLET ORAL at 21:12

## 2024-03-19 RX ADMIN — IPRATROPIUM BROMIDE AND ALBUTEROL SULFATE 1 DOSE: .5; 3 SOLUTION RESPIRATORY (INHALATION) at 16:52

## 2024-03-19 RX ADMIN — HEPARIN SODIUM AND DEXTROSE 15 UNITS/KG/HR: 10000; 5 INJECTION INTRAVENOUS at 17:38

## 2024-03-19 RX ADMIN — IOPAMIDOL 65 ML: 612 INJECTION, SOLUTION INTRAVENOUS at 20:08

## 2024-03-19 RX ADMIN — MIDAZOLAM 1 MG: 1 INJECTION INTRAMUSCULAR; INTRAVENOUS at 19:13

## 2024-03-19 RX ADMIN — SODIUM CHLORIDE: 9 INJECTION, SOLUTION INTRAVENOUS at 21:17

## 2024-03-19 RX ADMIN — FENTANYL CITRATE 25 MCG: 50 INJECTION, SOLUTION INTRAMUSCULAR; INTRAVENOUS at 19:38

## 2024-03-19 RX ADMIN — IOPAMIDOL 75 ML: 755 INJECTION, SOLUTION INTRAVENOUS at 16:41

## 2024-03-19 RX ADMIN — MIDAZOLAM 0.5 MG: 1 INJECTION INTRAMUSCULAR; INTRAVENOUS at 19:39

## 2024-03-19 RX ADMIN — SODIUM CHLORIDE 500 ML: 9 INJECTION, SOLUTION INTRAVENOUS at 18:02

## 2024-03-19 RX ADMIN — WATER 1000 MG: 1 INJECTION INTRAMUSCULAR; INTRAVENOUS; SUBCUTANEOUS at 17:18

## 2024-03-19 RX ADMIN — FUROSEMIDE 40 MG: 10 INJECTION, SOLUTION INTRAMUSCULAR; INTRAVENOUS at 15:31

## 2024-03-19 ASSESSMENT — ENCOUNTER SYMPTOMS
SHORTNESS OF BREATH: 1
SORE THROAT: 0
ABDOMINAL PAIN: 0
VOMITING: 0
DIARRHEA: 0
BACK PAIN: 0
NAUSEA: 0

## 2024-03-19 ASSESSMENT — PAIN - FUNCTIONAL ASSESSMENT: PAIN_FUNCTIONAL_ASSESSMENT: 0-10

## 2024-03-19 ASSESSMENT — LIFESTYLE VARIABLES
HOW OFTEN DO YOU HAVE A DRINK CONTAINING ALCOHOL: NEVER
HOW MANY STANDARD DRINKS CONTAINING ALCOHOL DO YOU HAVE ON A TYPICAL DAY: PATIENT DOES NOT DRINK

## 2024-03-19 ASSESSMENT — PAIN SCALES - GENERAL: PAINLEVEL_OUTOF10: 0

## 2024-03-19 NOTE — ED PROVIDER NOTES
Eosinophils % 2 0.5 - 7.8 %    Basophils % 1 0.0 - 2.0 %    Immature Granulocytes 0 0.0 - 5.0 %    Neutrophils Absolute 10.3 (H) 1.7 - 8.2 K/UL    Lymphocytes Absolute 2.3 0.5 - 4.6 K/UL    Monocytes Absolute 1.3 0.1 - 1.3 K/UL    Eosinophils Absolute 0.3 0.0 - 0.8 K/UL    Basophils Absolute 0.1 0.0 - 0.2 K/UL    Absolute Immature Granulocyte 0.0 0.0 - 0.5 K/UL   D-Dimer, Quantitative   Result Value Ref Range    D-Dimer, Quant 15.04 (H) <0.56 ug/ml(FEU)   Troponin   Result Value Ref Range    Troponin, High Sensitivity 606.0 (HH) 0 - 14 pg/mL   Brain Natriuretic Peptide   Result Value Ref Range    NT Pro-BNP 5,183 (H) 5 - 125 PG/ML   Troponin   Result Value Ref Range    Troponin, High Sensitivity 541.1 (HH) 0 - 14 pg/mL   Lactate, Sepsis   Result Value Ref Range    Lactic Acid, Sepsis 1.4 0.4 - 2.0 MMOL/L   Procalcitonin   Result Value Ref Range    Procalcitonin <0.05 0.00 - 0.49 ng/mL   APTT   Result Value Ref Range    APTT >200.0 (HH) 23.3 - 37.4 SEC   Protime-INR   Result Value Ref Range    Protime 17.5 (H) 11.3 - 14.9 sec    INR 1.4     Anti-Xa, Unfractionated Heparin   Result Value Ref Range    Anti-XA Unfrac Heparin >1.1 (H) 0.3 - 0.7 IU/mL   CBC   Result Value Ref Range    WBC 14.4 (H) 4.3 - 11.1 K/uL    RBC 5.31 4.23 - 5.6 M/uL    Hemoglobin 15.1 13.6 - 17.2 g/dL    Hematocrit 45.2 41.1 - 50.3 %    MCV 85.1 82 - 102 FL    MCH 28.4 26.1 - 32.9 PG    MCHC 33.4 31.4 - 35.0 g/dL    RDW 13.5 11.9 - 14.6 %    Platelets 181 150 - 450 K/uL    MPV 9.6 9.4 - 12.3 FL    nRBC 0.00 0.0 - 0.2 K/uL   APTT   Result Value Ref Range    APTT 198.7 (HH) 23.3 - 37.4 SEC   Protime-INR   Result Value Ref Range    Protime 17.1 (H) 11.3 - 14.9 sec    INR 1.3     Anti-Xa, Unfractionated Heparin   Result Value Ref Range    Anti-XA Unfrac Heparin >1.1 (H) 0.3 - 0.7 IU/mL   EKG 12 Lead   Result Value Ref Range    Ventricular Rate 92 BPM    Atrial Rate 92 BPM    P-R Interval 168 ms    QRS Duration 98 ms    Q-T Interval 382 ms    QTc

## 2024-03-19 NOTE — PRE SEDATION
Sedation Pre-Procedure Note    Patient Name: Alexander Bland   YOB: 1955  Room/Bed: [place] [address]  Medical Record Number: [id number]  Date: 3/19/2024   Time: 6:32 PM       Indication:  Bi PE, Hypoxia, elevated Troponin    Consent: I have discussed with the patient and/or the patient representative the indication, alternatives, and the possible risks and/or complications of the planned procedure and the anesthesia methods. The patient and/or patient representative appear to understand and agree to proceed.  Wife and daughter present.     Vital Signs:   Vitals:    03/19/24 1653   BP:    Pulse: 81   Resp: 18   Temp:    SpO2: 95%       Past Medical History:   has a past medical history of Coronary artery disease, Diabetes mellitus, type 2 (HCC), Hyperlipidemia associated with type 2 diabetes mellitus (HCC), Hypertension, essential, Morbid obesity (HCC), and Statin intolerance.    Past Surgical History:   has a past surgical history that includes shoulder surgery (Left); Cardiac procedure (N/A, 12/13/2023); Cardiac procedure (N/A, 12/13/2023); Cardiac procedure (N/A, 12/13/2023); Cardiac procedure (N/A, 12/27/2023); Cardiac procedure (N/A, 12/27/2023); and Cardiac procedure (N/A, 12/27/2023).    Medications:   Scheduled Meds:    azithromycin  500 mg IntraVENous Once    sodium chloride  500 mL IntraVENous Once   Plavix.   Continuous Infusions:    heparin (PORCINE) Infusion 15 Units/kg/hr (03/19/24 5640)     PRN Meds: nitroGLYCERIN, heparin (porcine), heparin (porcine)  Home Meds:   Prior to Admission medications    Medication Sig Start Date End Date Taking? Authorizing Provider   amLODIPine (NORVASC) 10 MG tablet Take 1 tablet by mouth daily 1/31/24   Augusto Foreman MD   nitroGLYCERIN (NITROSTAT) 0.4 MG SL tablet Place 1 tablet under the tongue every 5 minutes as needed for Chest pain (take one tablet by mouth under tongue every 5 min x 3 as neede for chest pain.If no relief by 3rd dose call 911.)

## 2024-03-20 ENCOUNTER — APPOINTMENT (OUTPATIENT)
Dept: NON INVASIVE DIAGNOSTICS | Age: 69
DRG: 163 | End: 2024-03-20
Attending: HOSPITALIST
Payer: MEDICARE

## 2024-03-20 VITALS
RESPIRATION RATE: 20 BRPM | TEMPERATURE: 98.1 F | WEIGHT: 291 LBS | HEIGHT: 70 IN | DIASTOLIC BLOOD PRESSURE: 74 MMHG | SYSTOLIC BLOOD PRESSURE: 135 MMHG | BODY MASS INDEX: 41.66 KG/M2 | OXYGEN SATURATION: 91 % | HEART RATE: 67 BPM

## 2024-03-20 LAB
ALBUMIN SERPL-MCNC: 2.4 G/DL (ref 3.2–4.6)
ALBUMIN/GLOB SERPL: 0.6 (ref 0.4–1.6)
ALP SERPL-CCNC: 59 U/L (ref 50–136)
ALT SERPL-CCNC: 22 U/L (ref 12–65)
ANION GAP SERPL CALC-SCNC: 8 MMOL/L (ref 2–11)
AST SERPL-CCNC: 23 U/L (ref 15–37)
BASOPHILS # BLD: 0.1 K/UL (ref 0–0.2)
BASOPHILS NFR BLD: 1 % (ref 0–2)
BILIRUB SERPL-MCNC: 0.7 MG/DL (ref 0.2–1.1)
BUN SERPL-MCNC: 21 MG/DL (ref 8–23)
CALCIUM SERPL-MCNC: 8.2 MG/DL (ref 8.3–10.4)
CHLORIDE SERPL-SCNC: 112 MMOL/L (ref 103–113)
CO2 SERPL-SCNC: 21 MMOL/L (ref 21–32)
CREAT SERPL-MCNC: 1.4 MG/DL (ref 0.8–1.5)
DIFFERENTIAL METHOD BLD: ABNORMAL
ECHO AO ASC DIAM: 3.9 CM
ECHO AO ASCENDING AORTA INDEX: 1.59 CM/M2
ECHO AO ROOT DIAM: 3.8 CM
ECHO AO ROOT INDEX: 1.55 CM/M2
ECHO AV AREA PEAK VELOCITY: 1.7 CM2
ECHO AV AREA VTI: 2.2 CM2
ECHO AV AREA/BSA PEAK VELOCITY: 0.7 CM2/M2
ECHO AV AREA/BSA VTI: 0.9 CM2/M2
ECHO AV MEAN GRADIENT: 6 MMHG
ECHO AV MEAN VELOCITY: 1.1 M/S
ECHO AV PEAK GRADIENT: 10 MMHG
ECHO AV PEAK VELOCITY: 1.6 M/S
ECHO AV VELOCITY RATIO: 0.5
ECHO AV VTI: 31.6 CM
ECHO BSA: 2.55 M2
ECHO IVC PROX: 1.9 CM
ECHO LA AREA 2C: 15 CM2
ECHO LA AREA 4C: 17.5 CM2
ECHO LA DIAMETER INDEX: 1.39 CM/M2
ECHO LA DIAMETER: 3.4 CM
ECHO LA MAJOR AXIS: 6.3 CM
ECHO LA MINOR AXIS: 4.7 CM
ECHO LA TO AORTIC ROOT RATIO: 0.89
ECHO LA VOL MOD A2C: 40 ML (ref 18–58)
ECHO LA VOL MOD A4C: 40 ML (ref 18–58)
ECHO LA VOLUME INDEX MOD A2C: 16 ML/M2 (ref 16–34)
ECHO LA VOLUME INDEX MOD A4C: 16 ML/M2 (ref 16–34)
ECHO LV E' LATERAL VELOCITY: 11 CM/S
ECHO LV E' SEPTAL VELOCITY: 9 CM/S
ECHO LV EDV A2C: 134 ML
ECHO LV EDV A4C: 146 ML
ECHO LV EDV INDEX A4C: 60 ML/M2
ECHO LV EDV NDEX A2C: 55 ML/M2
ECHO LV EJECTION FRACTION A2C: 56 %
ECHO LV EJECTION FRACTION A4C: 57 %
ECHO LV EJECTION FRACTION BIPLANE: 58 % (ref 55–100)
ECHO LV ESV A2C: 59 ML
ECHO LV ESV A4C: 62 ML
ECHO LV ESV INDEX A2C: 24 ML/M2
ECHO LV ESV INDEX A4C: 25 ML/M2
ECHO LV FRACTIONAL SHORTENING: 29 % (ref 28–44)
ECHO LV INTERNAL DIMENSION DIASTOLE INDEX: 2.12 CM/M2
ECHO LV INTERNAL DIMENSION DIASTOLIC: 5.2 CM (ref 4.2–5.9)
ECHO LV INTERNAL DIMENSION SYSTOLIC INDEX: 1.51 CM/M2
ECHO LV INTERNAL DIMENSION SYSTOLIC: 3.7 CM
ECHO LV IVSD: 0.8 CM (ref 0.6–1)
ECHO LV MASS 2D: 169 G (ref 88–224)
ECHO LV MASS INDEX 2D: 69 G/M2 (ref 49–115)
ECHO LV POSTERIOR WALL DIASTOLIC: 1 CM (ref 0.6–1)
ECHO LV RELATIVE WALL THICKNESS RATIO: 0.38
ECHO LVOT AREA: 3.5 CM2
ECHO LVOT AV VTI INDEX: 0.64
ECHO LVOT DIAM: 2.1 CM
ECHO LVOT MEAN GRADIENT: 2 MMHG
ECHO LVOT PEAK GRADIENT: 3 MMHG
ECHO LVOT PEAK VELOCITY: 0.8 M/S
ECHO LVOT STROKE VOLUME INDEX: 28.4 ML/M2
ECHO LVOT SV: 69.6 ML
ECHO LVOT VTI: 20.1 CM
ECHO MV A VELOCITY: 0.94 M/S
ECHO MV AREA VTI: 2.5 CM2
ECHO MV E DECELERATION TIME (DT): 300 MS
ECHO MV E VELOCITY: 0.7 M/S
ECHO MV E/A RATIO: 0.74
ECHO MV E/E' LATERAL: 6.36
ECHO MV E/E' RATIO (AVERAGED): 7.07
ECHO MV LVOT VTI INDEX: 1.36
ECHO MV MAX VELOCITY: 0.9 M/S
ECHO MV MEAN GRADIENT: 1 MMHG
ECHO MV MEAN VELOCITY: 0.5 M/S
ECHO MV PEAK GRADIENT: 3 MMHG
ECHO MV VTI: 27.4 CM
ECHO PV ACCELERATION TIME (AT): 70 MS
ECHO PV MAX VELOCITY: 1.3 M/S
ECHO PV PEAK GRADIENT: 6 MMHG
ECHO RV FREE WALL PEAK S': 13 CM/S
ECHO RV INTERNAL DIMENSION: 3.8 CM
ECHO RV TAPSE: 2 CM (ref 1.7–?)
EOSINOPHIL # BLD: 0.5 K/UL (ref 0–0.8)
EOSINOPHIL NFR BLD: 4 % (ref 0.5–7.8)
ERYTHROCYTE [DISTWIDTH] IN BLOOD BY AUTOMATED COUNT: 13.3 % (ref 11.9–14.6)
GLOBULIN SER CALC-MCNC: 4.2 G/DL (ref 2.8–4.5)
GLUCOSE SERPL-MCNC: 120 MG/DL (ref 65–100)
HCT VFR BLD AUTO: 41.6 % (ref 41.1–50.3)
HGB BLD-MCNC: 14.1 G/DL (ref 13.6–17.2)
IMM GRANULOCYTES # BLD AUTO: 0 K/UL (ref 0–0.5)
IMM GRANULOCYTES NFR BLD AUTO: 0 % (ref 0–5)
LYMPHOCYTES # BLD: 2.2 K/UL (ref 0.5–4.6)
LYMPHOCYTES NFR BLD: 19 % (ref 13–44)
MAGNESIUM SERPL-MCNC: 1.9 MG/DL (ref 1.8–2.4)
MCH RBC QN AUTO: 29.1 PG (ref 26.1–32.9)
MCHC RBC AUTO-ENTMCNC: 33.9 G/DL (ref 31.4–35)
MCV RBC AUTO: 85.8 FL (ref 82–102)
MONOCYTES # BLD: 1.3 K/UL (ref 0.1–1.3)
MONOCYTES NFR BLD: 11 % (ref 4–12)
NEUTS SEG # BLD: 7.5 K/UL (ref 1.7–8.2)
NEUTS SEG NFR BLD: 65 % (ref 43–78)
NRBC # BLD: 0 K/UL (ref 0–0.2)
PLATELET # BLD AUTO: 197 K/UL (ref 150–450)
PMV BLD AUTO: 10.2 FL (ref 9.4–12.3)
POTASSIUM SERPL-SCNC: 3.4 MMOL/L (ref 3.5–5.1)
PROT SERPL-MCNC: 6.6 G/DL (ref 6.3–8.2)
RBC # BLD AUTO: 4.85 M/UL (ref 4.23–5.6)
SODIUM SERPL-SCNC: 141 MMOL/L (ref 136–146)
UFH PPP CHRO-ACNC: 0.59 IU/ML (ref 0.3–0.7)
WBC # BLD AUTO: 11.5 K/UL (ref 4.3–11.1)

## 2024-03-20 PROCEDURE — 93306 TTE W/DOPPLER COMPLETE: CPT | Performed by: INTERNAL MEDICINE

## 2024-03-20 PROCEDURE — 93306 TTE W/DOPPLER COMPLETE: CPT

## 2024-03-20 PROCEDURE — 85025 COMPLETE CBC W/AUTO DIFF WBC: CPT

## 2024-03-20 PROCEDURE — 36415 COLL VENOUS BLD VENIPUNCTURE: CPT

## 2024-03-20 PROCEDURE — 80053 COMPREHEN METABOLIC PANEL: CPT

## 2024-03-20 PROCEDURE — 85520 HEPARIN ASSAY: CPT

## 2024-03-20 PROCEDURE — 6370000000 HC RX 637 (ALT 250 FOR IP): Performed by: INTERNAL MEDICINE

## 2024-03-20 PROCEDURE — 6370000000 HC RX 637 (ALT 250 FOR IP): Performed by: HOSPITALIST

## 2024-03-20 PROCEDURE — 83735 ASSAY OF MAGNESIUM: CPT

## 2024-03-20 PROCEDURE — 6360000002 HC RX W HCPCS: Performed by: HOSPITALIST

## 2024-03-20 PROCEDURE — 2580000003 HC RX 258: Performed by: HOSPITALIST

## 2024-03-20 RX ORDER — AZITHROMYCIN 250 MG/1
250 TABLET, FILM COATED ORAL DAILY
Status: DISCONTINUED | OUTPATIENT
Start: 2024-03-20 | End: 2024-03-20 | Stop reason: HOSPADM

## 2024-03-20 RX ORDER — POTASSIUM CHLORIDE 20 MEQ/1
20 TABLET, EXTENDED RELEASE ORAL ONCE
Status: COMPLETED | OUTPATIENT
Start: 2024-03-20 | End: 2024-03-20

## 2024-03-20 RX ADMIN — ASPIRIN 81 MG: 81 TABLET, COATED ORAL at 09:18

## 2024-03-20 RX ADMIN — HEPARIN SODIUM AND DEXTROSE 12 UNITS/KG/HR: 10000; 5 INJECTION INTRAVENOUS at 07:15

## 2024-03-20 RX ADMIN — POTASSIUM CHLORIDE 20 MEQ: 1500 TABLET, EXTENDED RELEASE ORAL at 09:17

## 2024-03-20 RX ADMIN — APIXABAN 10 MG: 5 TABLET, FILM COATED ORAL at 09:29

## 2024-03-20 RX ADMIN — AMLODIPINE BESYLATE 10 MG: 10 TABLET ORAL at 09:17

## 2024-03-20 RX ADMIN — SODIUM CHLORIDE: 9 INJECTION, SOLUTION INTRAVENOUS at 04:41

## 2024-03-20 RX ADMIN — SODIUM CHLORIDE: 9 INJECTION, SOLUTION INTRAVENOUS at 12:54

## 2024-03-20 RX ADMIN — SODIUM CHLORIDE, PRESERVATIVE FREE 10 ML: 5 INJECTION INTRAVENOUS at 09:20

## 2024-03-20 RX ADMIN — CARVEDILOL 3.12 MG: 6.25 TABLET, FILM COATED ORAL at 09:18

## 2024-03-20 ASSESSMENT — PAIN SCALES - GENERAL: PAINLEVEL_OUTOF10: 0

## 2024-03-20 NOTE — PROGRESS NOTES
TRANSFER - IN REPORT:    Verbal report received from Cody RN(name) on Alexander Bland  being received from IR(unit) for routine progression of patient care      Report consisted of patient’s Situation, Background, Assessment and   Recommendations(SBAR).     Information from the following report(s) Nurse Handoff Report, Index, ED Encounter Summary, ED SBAR, Adult Overview, Surgery Report, Intake/Output, MAR, Recent Results, Quality Measures, Neuro Assessment, and Event Log was reviewed with the receiving nurse.    Opportunity for questions and clarification was provided.      Assessment completed upon patient’s arrival to unit and care assume

## 2024-03-20 NOTE — PROGRESS NOTES
Pt was educated on discharge instructions. All questions answered. Pt verbalized understanding. Pt wheeled out via wheelchair to personal car to be taken home by family member.

## 2024-03-20 NOTE — H&P
Calcium 9.5 8.3 - 10.4 MG/DL    Total Bilirubin 0.8 0.2 - 1.1 MG/DL    ALT 28 12 - 65 U/L    AST 27 15 - 37 U/L    Alk Phosphatase 76 50 - 136 U/L    Total Protein 8.6 (H) 6.3 - 8.2 g/dL    Albumin 3.2 3.2 - 4.6 g/dL    Globulin 5.4 (H) 2.8 - 4.5 g/dL    Albumin/Globulin Ratio 0.6 0.4 - 1.6     CBC with Auto Differential    Collection Time: 03/19/24  3:22 PM   Result Value Ref Range    WBC 14.2 (H) 4.3 - 11.1 K/uL    RBC 5.77 (H) 4.23 - 5.6 M/uL    Hemoglobin 16.2 13.6 - 17.2 g/dL    Hematocrit 48.7 41.1 - 50.3 %    MCV 84.4 82 - 102 FL    MCH 28.1 26.1 - 32.9 PG    MCHC 33.3 31.4 - 35.0 g/dL    RDW 13.6 11.9 - 14.6 %    Platelets 236 150 - 450 K/uL    MPV 9.9 9.4 - 12.3 FL    nRBC 0.00 0.0 - 0.2 K/uL    Differential Type AUTOMATED      Neutrophils % 72 43 - 78 %    Lymphocytes % 16 13 - 44 %    Monocytes % 9 4.0 - 12.0 %    Eosinophils % 2 0.5 - 7.8 %    Basophils % 1 0.0 - 2.0 %    Immature Granulocytes 0 0.0 - 5.0 %    Neutrophils Absolute 10.3 (H) 1.7 - 8.2 K/UL    Lymphocytes Absolute 2.3 0.5 - 4.6 K/UL    Monocytes Absolute 1.3 0.1 - 1.3 K/UL    Eosinophils Absolute 0.3 0.0 - 0.8 K/UL    Basophils Absolute 0.1 0.0 - 0.2 K/UL    Absolute Immature Granulocyte 0.0 0.0 - 0.5 K/UL   D-Dimer, Quantitative    Collection Time: 03/19/24  3:22 PM   Result Value Ref Range    D-Dimer, Quant 15.04 (H) <0.56 ug/ml(FEU)   Brain Natriuretic Peptide    Collection Time: 03/19/24  3:22 PM   Result Value Ref Range    NT Pro-BNP 5,183 (H) 5 - 125 PG/ML   Troponin    Collection Time: 03/19/24  3:22 PM   Result Value Ref Range    Troponin, High Sensitivity 541.1 (HH) 0 - 14 pg/mL   Procalcitonin    Collection Time: 03/19/24  3:26 PM   Result Value Ref Range    Procalcitonin <0.05 0.00 - 0.49 ng/mL   COVID-19, Rapid    Collection Time: 03/19/24  3:44 PM    Specimen: Nasopharyngeal   Result Value Ref Range    Source NASAL      SARS-CoV-2, Rapid Not detected NOTD     Lactate, Sepsis    Collection Time: 03/19/24  4:33 PM   Result Value

## 2024-03-20 NOTE — BRIEF OP NOTE
[facility]  Department of Interventional Radiology  [phone]       Brief Procedure Note    Patient: Alexander Bland MRN: [id number]  SSN: [id number]    YOB: 1955  Age: 68 y.o.  Sex: male      Date of Procedure: 3/19/2024    Pre-Procedure Diagnosis: Bi PE, RHS, Hypoxia    Post-Procedure Diagnosis: SAME    Procedure(s):  Pulmonary Intervention    Brief Description of Procedure: R GSV access.     Performed By: NISA FLEMING MD     Assistants: None    Anesthesia:Moderate Sedation    Estimated Blood Loss: Less than 30 ml    Specimens:  None    Implants:  None    Findings: Large volume, acute, PE    Complications: None    Recommendations: 1 hour bedrest.  Continue Heparin qtt until LE US complete and intervention performed, if necessary.      Follow Up: Will follow as inpatient.     Signed By: NISA FLEMING MD     March 19, 2024

## 2024-03-20 NOTE — CONSULTS
am  3/20/2024 10:03 AM    Admit Date: 3/19/2024    Admit Diagnosis: Hypoxia [R09.02]  Bilateral pulmonary embolism (HCC) [I26.99]  Pulmonary embolism, bilateral (HCC) [I26.99]  Morbid obesity with BMI of 40.0-44.9, adult (HCC) [E66.01, Z68.41]  Acute hypoxemic respiratory failure (HCC) [J96.01]  Pneumonia due to infectious organism, unspecified laterality, unspecified part of lung [J18.9]      Subjective:    Patient : He is a 68 y.o. male with a history of CAD s/p stent placement, HLD, htn. He presented to the ER with worsening dyspnea, hypoxia, and left sided chest pain. Patient underwent thrombectomy for pulmonary embolus. SOB is improving and he is on 4 L of oxygen. He had stents placed 3 months ago and was started on DAPT, he follows with Dr. Foreman of [facility] cardiology. Eliquis was started during hospitalization and echo is pending. He denies chest pain and palpitations. He states he feels better today.    Cardiology consulted by Dr. Saldaña for long-term anticoagulation recommendations.    Objective:    BP (!) 165/80   Pulse 64   Temp 97.7 °F (36.5 °C) (Oral)   Resp 22   Ht 1.778 m (5' 10\")   Wt 132 kg (291 lb)   SpO2 96%   BMI 41.75 kg/m²     ROS:  General ROS: negative for - chills  Hematological and Lymphatic ROS: negative for - blood clots or jaundice  Respiratory ROS: positive for - shortness of breath  Cardiovascular ROS: no chest pain or dyspnea on exertion  Gastrointestinal ROS: no abdominal pain, change in bowel habits, or black or bloody stools  Neurological ROS: no TIA or stroke symptoms    Physical Exam:      Physical Examination: General appearance - Appearance: alert, well appearing, and in no distress.   Neck/lymph - supple, no significant adenopathy  Chest/CV - clear to auscultation, no wheezes, rales or rhonchi, symmetric air entry  Heart - normal rate, regular rhythm, normal S1, S2, no murmurs, rubs, clicks or gallops  Abdomen/GI - soft, nontender, nondistended, no masses or organomegaly

## 2024-03-20 NOTE — PROGRESS NOTES
TRANSFER - IN REPORT:    Verbal report received from Robbin NEGRETE on Alexander Bland  being received from CCU for routine progression of patient care      Report consisted of patient's Situation, Background, Assessment and   Recommendations(SBAR).     Information from the following report(s) Nurse Handoff Report was reviewed with the receiving nurse.    Opportunity for questions and clarification was provided.      Assessment completed upon patient's arrival to unit and care assumed.

## 2024-03-20 NOTE — PROGRESS NOTES
Pt arrived to floor 2157. Respirations even and unlabored on 4L NC. No signs of distress noted. Heparin drip gtt infusing at 15u/kg/hr. Pt alert and oriented. Call light within reach.

## 2024-03-20 NOTE — PROGRESS NOTES
[facility]  Department of Interventional Radiology  [phone]                                 Progress Note  Patient: Alexander Bland MRN: [id number]  SSN: [id number]    YOB: 1955  Age: 68 y.o.  Sex: male      Subjective:   Feels well this morning.  No new complaints.      Review of Systems:    Pertinent items are noted in HPI.    Objective:     Vitals:    03/20/24 0600 03/20/24 0605 03/20/24 0746 03/20/24 0917   BP:   (!) 156/80 (!) 165/80   Pulse:  62 64 64   Resp:   22    Temp:   97.7 °F (36.5 °C)    TempSrc:   Oral    SpO2:   96%    Weight: 132 kg (291 lb)      Height:            Pain Assessment                      Pain Level: 0                                     Intake and Output:             Physical Exam:   HEART: regular rate and rhythm  LUNG: clear to auscultation bilaterally  ABDOMEN: normal findings: soft, non-tender  EXTREMITIES: warm, trace BLE edema.  Right groin access site soft, small amount of blood ooze once closure device removed.   Lab/Data Review:  CBC:   Lab Results   Component Value Date/Time    WBC 11.5 03/20/2024 05:20 AM    RBC 4.85 03/20/2024 05:20 AM    HGB 14.1 03/20/2024 05:20 AM    HCT 41.6 03/20/2024 05:20 AM    MCV 85.8 03/20/2024 05:20 AM    MCH 29.1 03/20/2024 05:20 AM    MCHC 33.9 03/20/2024 05:20 AM    RDW 13.3 03/20/2024 05:20 AM     03/20/2024 05:20 AM    MPV 10.2 03/20/2024 05:20 AM     CMP:    Lab Results   Component Value Date/Time     03/20/2024 05:20 AM    K 3.4 03/20/2024 05:20 AM     03/20/2024 05:20 AM    CO2 21 03/20/2024 05:20 AM    BUN 21 03/20/2024 05:20 AM    CREATININE 1.40 03/20/2024 05:20 AM    AGRATIO 0.6 03/20/2024 05:20 AM    LABGLOM 55 03/20/2024 05:20 AM    GLUCOSE 120 03/20/2024 05:20 AM    PROT 6.6 03/20/2024 05:20 AM    LABALBU 2.4 03/20/2024 05:20 AM    CALCIUM 8.2 03/20/2024 05:20 AM    BILITOT 0.7 03/20/2024 05:20 AM    ALKPHOS 59 03/20/2024 05:20 AM    AST 23 03/20/2024

## 2024-03-20 NOTE — CARE COORDINATION
03/20/24 1109   Service Assessment   Patient Orientation Alert and Oriented   Cognition Alert   History Provided By Patient   Primary Caregiver Self   Accompanied By/Relationship no one at bedside   Support Systems Spouse/Significant Other   Patient's Healthcare Decision Maker is: Legal Next of Kin   PCP Verified by CM Yes   Last Visit to PCP Within last 3 months   Prior Functional Level Independent in ADLs/IADLs   Current Functional Level Independent in ADLs/IADLs   Can patient return to prior living arrangement Yes   Ability to make needs known: Good   Family able to assist with home care needs: Yes   Would you like for me to discuss the discharge plan with any other family members/significant others, and if so, who? Yes   Financial Resources Medicare   Community Resources None   Social/Functional History   Lives With Spouse   Type of Home House   Receives Help From Family   ADL Assistance Independent   Homemaking Assistance Independent   Homemaking Responsibilities No   Ambulation Assistance Independent   Transfer Assistance Independent   Active  Yes   Mode of Transportation Car   Occupation Retired   Discharge Planning   Type of Residence House   Living Arrangements Spouse/Significant Other   Current Services Prior To Admission C-pap   Potential Assistance Needed N/A   DME Ordered? No   Potential Assistance Purchasing Medications No   Type of Home Care Services None   Patient expects to be discharged to: House   History of falls? 0     Patient confirmed he has a pcp. Drives. No history of HH or rehab at snf. Patient has been to cardiac rehab. If patient is discharged on Eliquis he will be given a 30 day free coupon. CM following.

## 2024-03-20 NOTE — PROGRESS NOTES
TRANSFER - OUT REPORT:    Verbal report given to CADEN Siegel on Alexander Bland  being transferred to CCU for routine progression of patient care       Report consisted of patient's Situation, Background, Assessment and   Recommendations(SBAR).     Information from the following report(s) Nurse Handoff Report, Surgery Report, and MAR was reviewed with the receiving nurse.           Lines:   Peripheral IV 03/19/24 Right Antecubital (Active)        Opportunity for questions and clarification was provided.      Patient transported with:  Monitor and O2 @ 4lpm

## 2024-03-20 NOTE — PROGRESS NOTES
TRANSFER - OUT REPORT:    Verbal report given to Antolin RN(name) on Alexander Bland  being transferred to [facility](unit) for routine progression of patient care       Report consisted of patient’s Situation, Background, Assessment and   Recommendations(SBAR).     Information from the following report(s) Nurse Handoff Report, Index, ED Encounter Summary, ED SBAR, Adult Overview, Intake/Output, MAR, Recent Results, Quality Measures, Neuro Assessment, and Event Log was reviewed with the receiving nurse.    Opportunity for questions and clarification was provided.      Patient transported with:   Monitor  O2 @ 4 liters  Registered Nurse

## 2024-03-21 ENCOUNTER — CARE COORDINATION (OUTPATIENT)
Dept: CARE COORDINATION | Facility: CLINIC | Age: 69
End: 2024-03-21

## 2024-03-21 NOTE — DISCHARGE SUMMARY
Imaging/Tests:   IR GUIDED THROMB [facility] VEIN    Result Date: 3/20/2024  Angiography of the bilateral pulmonary arteries demonstrates extensive segmental and subsegmental PE. Following aspiration thrombectomy, no significant pulmonary emboli remains. Heart rate and respiratory rate immediately improved during the thrombectomy procedure. Plan: 1 hour bedrest. Suture removal tomorrow. Bilateral lower extremity venous ultrasound (performed shortly after this procedure) demonstrates left posterior tibial vein DVT, only. No role for lower extremity venous intervention. The patient will transitioned from heparin to to oral anticoagulation. Anticipate lifelong anticoagulation. _______________________________________________________________ PROCEDURE SUMMARY: - Venous access with ultrasound guidance -Bilateral main pulmonary angiography - Superselective pulmonary angiography: Left lower lobe, right lower lobe, right upper lobe - Pulmonary arterial interventions as described below - Additional procedure(s): Inari FlowSaver with immediate blood return PROCEDURE DETAILS: Pre-procedure Consent:  Informed written and oral consent for the procedure was obtained after explanation of risks (including, but not limitted to:  hemorrhage, vascular injury, infection) benefits, and alternatives.  The patient's questions were answered to his/her satisfaction.  He/She stated understanding and requested that we proceed. The patient's spouse and daughter were present throughout the consent process. All questions were answered. Final verification:  A time-out identifying the patient and planned procedure was performed prior to this procedure.  Preparation (MIPS):  Maximal sterile barrier technique (including:  Sterile Ultrasound probe cover, Sterile Ultrasound gel, cap, mask, Sterile Gown, Sterile Gloves, Sterile Drape, hand hygiene, and 2% chlorhexidine cutaneous antisepsis) was used. Anesthesia/sedation Level of anesthesia/sedation:  Moderate

## 2024-03-28 ENCOUNTER — CARE COORDINATION (OUTPATIENT)
Dept: CARE COORDINATION | Facility: CLINIC | Age: 69
End: 2024-03-28

## 2024-03-28 NOTE — CARE COORDINATION
Patient returned call and left voice message while I was engaged in another call.  States that he is having continued episodes of shortness of breath, cough, fatigue, sleeping approximately 12 hours a day.  States has large bruised area at groin site of thrombectomy. States not able to be seen by PCP until 04/08/2024.  In attempt to return call again received patients voicemail.  I left a message and instructed him to contact his PCP and notify of symptoms, if develops increased shortness of breath or chest pain to please go to the ED.

## 2024-03-28 NOTE — CARE COORDINATION
Care Transitions Outreach Attempt    Call within 2 business days of discharge: Yes   Attempted to reach patient for transitions of care follow up. Unable to reach patient.    Patient: Alexander Bland Patient : 1955 MRN: [id number]    Last Discharge Facility       Date Complaint Diagnosis Description Type Department Provider    3/19/24 Shortness of Breath; Chest Pain Bilateral pulmonary embolism (HCC) ... ED to Hosp-Admission (Discharged) (ADMITTED) [facility] Oly Coreas, DO; MEGHANA Clinton..              Was this an external facility discharge? No Discharge Facility Name: [facility]    Noted following upcoming appointments from discharge chart review:   BS follow up appointment(s):   Future Appointments   Date Time Provider Department Center   2024  2:40 PM Marcial May MD [facility] AMB   2024 11:00 AM Augusto Foreman MD [facility] AMB   2024  9:15 AM Augusto Foreman MD [facility] AMB     Non-[facility]  follow up appointment(s): na    RODY outreach follow up call.  Left message, identified self, reason for call, return call contact information, Nery Rojo LPN -[phone].

## 2024-03-30 DIAGNOSIS — H60.311 ACUTE DIFFUSE OTITIS EXTERNA OF RIGHT EAR: ICD-10-CM

## 2024-04-01 RX ORDER — CIPROFLOXACIN 0.5 MG/.25ML
0.25 SOLUTION/ DROPS AURICULAR (OTIC) 2 TIMES DAILY
OUTPATIENT
Start: 2024-04-01

## 2024-04-02 NOTE — TELEPHONE ENCOUNTER
Requested Prescriptions     Pending Prescriptions Disp Refills    amLODIPine (NORVASC) 10 MG tablet 90 tablet 1     Sig: Take 1 tablet by mouth daily    carvedilol (COREG) 3.125 MG tablet 180 tablet 1     Sig: Take 1 tablet by mouth daily

## 2024-04-03 RX ORDER — CARVEDILOL 3.12 MG/1
3.12 TABLET ORAL DAILY
Qty: 180 TABLET | Refills: 1 | Status: SHIPPED | OUTPATIENT
Start: 2024-04-03

## 2024-04-03 RX ORDER — AMLODIPINE BESYLATE 10 MG/1
10 TABLET ORAL DAILY
Qty: 90 TABLET | Refills: 1 | Status: SHIPPED | OUTPATIENT
Start: 2024-04-03

## 2024-04-04 ENCOUNTER — CARE COORDINATION (OUTPATIENT)
Dept: CARE COORDINATION | Facility: CLINIC | Age: 69
End: 2024-04-04

## 2024-04-04 RX ORDER — CARVEDILOL 3.12 MG/1
3.12 TABLET ORAL DAILY
Qty: 180 TABLET | Refills: 1 | Status: CANCELLED | OUTPATIENT
Start: 2024-04-04

## 2024-04-04 RX ORDER — AMLODIPINE BESYLATE 10 MG/1
10 TABLET ORAL DAILY
Qty: 90 TABLET | Refills: 1 | Status: CANCELLED | OUTPATIENT
Start: 2024-04-04

## 2024-04-04 NOTE — CARE COORDINATION
Care Transitions Follow Up Call    Patient Current Location:  Home: [address] [place], [place] [id number]    LPN Care Coordinator contacted the patient by telephone to follow up after admission on 2024.  Verified name and  with patient as identifiers.    Patient: Alexander Bland  Patient : 1955   MRN: [id number]  Reason for Admission: Bilateral PE/pneumonia  Discharge Date: 3/20/24 RARS: Readmission Risk Score: 13.3      Needs to be reviewed by the provider   Additional needs identified to be addressed with provider: No  none             Method of communication with provider: none.    Addressed changes since last contact:  none  Discussed follow-up appointments. If no appointment was previously scheduled, appointment scheduling offered: Yes.   Is follow up appointment scheduled within 7 days of discharge? No.    Follow Up  Future Appointments   Date Time Provider Department Center   2024  2:40 PM Marcial May MD [facility] AMB   2024 11:00 AM Augusto Foreman MD [facility] AMB   2024  9:15 AM Augusto Foreman MD [facility] AMB     External follow up appointment(s): wu    N Care Coordinator reviewed discharge instructions, medical action plan, and red flags with patient and discussed any barriers to care and/or understanding of plan of care after discharge. Discussed appropriate site of care based on symptoms and resources available to patient including: PCP  Specialist  Urgent care clinics  When to call 911  [facility]. The patient agrees to contact the PCP office for questions related to their healthcare.     Advance Care Planning:   No documents, does verify receiving ACP forms mail to his home .     Patients top risk factors for readmission: medical condition-CAD, HTN, PE, MI, PONCHO, HLD, Morbid Obesity  Interventions to address risk factors: Obtained and reviewed discharge summary and/or continuity of care documents, Education of patient/family/caregiver/guardian to

## 2024-04-08 ENCOUNTER — OFFICE VISIT (OUTPATIENT)
Dept: INTERNAL MEDICINE CLINIC | Facility: CLINIC | Age: 69
End: 2024-04-08
Payer: MEDICARE

## 2024-04-08 VITALS
DIASTOLIC BLOOD PRESSURE: 64 MMHG | WEIGHT: 282 LBS | BODY MASS INDEX: 40.37 KG/M2 | HEART RATE: 56 BPM | SYSTOLIC BLOOD PRESSURE: 122 MMHG | HEIGHT: 70 IN

## 2024-04-08 DIAGNOSIS — E78.5 HYPERLIPIDEMIA ASSOCIATED WITH TYPE 2 DIABETES MELLITUS (HCC): ICD-10-CM

## 2024-04-08 DIAGNOSIS — E11.69 HYPERLIPIDEMIA ASSOCIATED WITH TYPE 2 DIABETES MELLITUS (HCC): ICD-10-CM

## 2024-04-08 DIAGNOSIS — E11.9 TYPE 2 DIABETES MELLITUS WITHOUT COMPLICATION, WITHOUT LONG-TERM CURRENT USE OF INSULIN (HCC): ICD-10-CM

## 2024-04-08 DIAGNOSIS — I10 PRIMARY HYPERTENSION: ICD-10-CM

## 2024-04-08 DIAGNOSIS — Z09 HOSPITAL DISCHARGE FOLLOW-UP: ICD-10-CM

## 2024-04-08 DIAGNOSIS — Z12.5 PROSTATE CANCER SCREENING: Chronic | ICD-10-CM

## 2024-04-08 DIAGNOSIS — E78.5 HYPERLIPIDEMIA, UNSPECIFIED HYPERLIPIDEMIA TYPE: ICD-10-CM

## 2024-04-08 DIAGNOSIS — I82.409 RECURRENT DEEP VENOUS THROMBOSIS (HCC): Primary | ICD-10-CM

## 2024-04-08 PROBLEM — J96.01 ACUTE HYPOXEMIC RESPIRATORY FAILURE (HCC): Status: RESOLVED | Noted: 2024-03-19 | Resolved: 2024-04-08

## 2024-04-08 PROBLEM — H60.311 ACUTE DIFFUSE OTITIS EXTERNA OF RIGHT EAR: Status: RESOLVED | Noted: 2023-08-28 | Resolved: 2024-04-08

## 2024-04-08 PROCEDURE — G8417 CALC BMI ABV UP PARAM F/U: HCPCS | Performed by: INTERNAL MEDICINE

## 2024-04-08 PROCEDURE — 1123F ACP DISCUSS/DSCN MKR DOCD: CPT | Performed by: INTERNAL MEDICINE

## 2024-04-08 PROCEDURE — 3074F SYST BP LT 130 MM HG: CPT | Performed by: INTERNAL MEDICINE

## 2024-04-08 PROCEDURE — 3017F COLORECTAL CA SCREEN DOC REV: CPT | Performed by: INTERNAL MEDICINE

## 2024-04-08 PROCEDURE — 99214 OFFICE O/P EST MOD 30 MIN: CPT | Performed by: INTERNAL MEDICINE

## 2024-04-08 PROCEDURE — 3078F DIAST BP <80 MM HG: CPT | Performed by: INTERNAL MEDICINE

## 2024-04-08 PROCEDURE — G8428 CUR MEDS NOT DOCUMENT: HCPCS | Performed by: INTERNAL MEDICINE

## 2024-04-08 PROCEDURE — G2211 COMPLEX E/M VISIT ADD ON: HCPCS | Performed by: INTERNAL MEDICINE

## 2024-04-08 PROCEDURE — 1036F TOBACCO NON-USER: CPT | Performed by: INTERNAL MEDICINE

## 2024-04-08 PROCEDURE — 2022F DILAT RTA XM EVC RTNOPTHY: CPT | Performed by: INTERNAL MEDICINE

## 2024-04-08 PROCEDURE — 1111F DSCHRG MED/CURRENT MED MERGE: CPT | Performed by: INTERNAL MEDICINE

## 2024-04-08 PROCEDURE — 3046F HEMOGLOBIN A1C LEVEL >9.0%: CPT | Performed by: INTERNAL MEDICINE

## 2024-04-08 ASSESSMENT — ENCOUNTER SYMPTOMS
EYE PAIN: 0
RECTAL PAIN: 0
STRIDOR: 0
CHOKING: 0
VOICE CHANGE: 0

## 2024-04-08 ASSESSMENT — PATIENT HEALTH QUESTIONNAIRE - PHQ9
SUM OF ALL RESPONSES TO PHQ9 QUESTIONS 1 & 2: 0
2. FEELING DOWN, DEPRESSED OR HOPELESS: NOT AT ALL
SUM OF ALL RESPONSES TO PHQ QUESTIONS 1-9: 0
SUM OF ALL RESPONSES TO PHQ QUESTIONS 1-9: 0
1. LITTLE INTEREST OR PLEASURE IN DOING THINGS: NOT AT ALL
SUM OF ALL RESPONSES TO PHQ QUESTIONS 1-9: 0
SUM OF ALL RESPONSES TO PHQ QUESTIONS 1-9: 0

## 2024-04-08 NOTE — PROGRESS NOTES
patient and/or patient representative voiced understanding and agreement with the current diagnoses, recommendations, and possible side effects.    Return in about 2 months (around 6/8/2024) for follow up of chronic medical problems, review labs.

## 2024-04-11 ENCOUNTER — CARE COORDINATION (OUTPATIENT)
Dept: CARE COORDINATION | Facility: CLINIC | Age: 69
End: 2024-04-11

## 2024-04-11 NOTE — CARE COORDINATION
Care Transitions Outreach Attempt    Call within 2 business days of discharge: Yes   Attempted to reach patient for transitions of care follow up. Unable to reach patient.    Patient: Alexander Bland Patient : 1955 MRN: [id number]    Last Discharge Facility       Date Complaint Diagnosis Description Type Department Provider    3/19/24 Shortness of Breath; Chest Pain Bilateral pulmonary embolism (HCC) ... ED to Hosp-Admission (Discharged) (ADMITTED) [facility] Oly Coreas, DO; MEGHANA Clinton..              Was this an external facility discharge? No Discharge Facility Name: 2024    Noted following upcoming appointments from discharge chart review:   BS follow up appointment(s):   Future Appointments   Date Time Provider Department Center   2024 11:00 AM Augusto Foreman MD [facility] AMB   2024  3:20 PM Marcial May MD [facility] AMB   2024  9:15 AM Augusto Foreman MD [facility] AMB     Non-BS  follow up appointment(s): na    RODY outreach follow up call.  Left message, identified self, reason for call, return call contact information, Nery Rojo N -[phone].

## 2024-04-17 DIAGNOSIS — Z12.5 PROSTATE CANCER SCREENING: Chronic | ICD-10-CM

## 2024-04-17 DIAGNOSIS — E11.69 HYPERLIPIDEMIA ASSOCIATED WITH TYPE 2 DIABETES MELLITUS (HCC): ICD-10-CM

## 2024-04-17 DIAGNOSIS — E11.9 TYPE 2 DIABETES MELLITUS WITHOUT COMPLICATION, WITHOUT LONG-TERM CURRENT USE OF INSULIN (HCC): ICD-10-CM

## 2024-04-17 DIAGNOSIS — E78.5 HYPERLIPIDEMIA ASSOCIATED WITH TYPE 2 DIABETES MELLITUS (HCC): ICD-10-CM

## 2024-04-17 LAB
ANION GAP SERPL CALC-SCNC: 2 MMOL/L (ref 2–11)
BUN SERPL-MCNC: 18 MG/DL (ref 8–23)
CALCIUM SERPL-MCNC: 9.1 MG/DL (ref 8.3–10.4)
CHLORIDE SERPL-SCNC: 106 MMOL/L (ref 103–113)
CHOLEST SERPL-MCNC: 218 MG/DL
CO2 SERPL-SCNC: 28 MMOL/L (ref 21–32)
CREAT SERPL-MCNC: 1.4 MG/DL (ref 0.8–1.5)
CREAT UR-MCNC: 75 MG/DL
EST. AVERAGE GLUCOSE BLD GHB EST-MCNC: 126 MG/DL
GLUCOSE SERPL-MCNC: 127 MG/DL (ref 65–100)
HBA1C MFR BLD: 6 % (ref 4.8–5.6)
HDLC SERPL-MCNC: 55 MG/DL (ref 40–60)
HDLC SERPL: 4
LDLC SERPL CALC-MCNC: 149 MG/DL
MICROALBUMIN UR-MCNC: 121 MG/DL
MICROALBUMIN/CREAT UR-RTO: 1613 MG/G (ref 0–30)
POTASSIUM SERPL-SCNC: 4.2 MMOL/L (ref 3.5–5.1)
PSA SERPL-MCNC: 2.7 NG/ML
SODIUM SERPL-SCNC: 136 MMOL/L (ref 136–146)
TRIGL SERPL-MCNC: 70 MG/DL (ref 35–150)
VLDLC SERPL CALC-MCNC: 14 MG/DL (ref 6–23)

## 2024-04-18 ENCOUNTER — CARE COORDINATION (OUTPATIENT)
Dept: CARE COORDINATION | Facility: CLINIC | Age: 69
End: 2024-04-18

## 2024-04-18 NOTE — CARE COORDINATION
Patient has graduated from the Care Transitions program on 04/18/2024.  Patient/family has the ability to self-manage at this time. Patient has no further care management needs, no referral to the ACM team for further management.     Patient has Care Transition Nurse's contact information for any further questions, concerns, or needs.  Nery Rojo MINDA -[phone].  Patients upcoming visits:    Future Appointments   Date Time Provider Department Center   4/25/2024 10:30 AM Augusto Foreman MD [facility] AMB   5/2/2024  3:20 PM Marcial May MD [facility] AMB   7/9/2024  9:15 AM Augusto Foreman MD [facility] AMB

## 2024-04-23 NOTE — PROGRESS NOTES
resolution\"  - Patient without unacceptable angina at this time  - Start Imdur 30 mg daily for angina prevention  - Continue with Coreg  - Patient with a high thrombotic risk with multiple coronary stents, with a history of statin intolerance and hesitancy to start PCSK9 inhibitors and bempedoic acid: Thus, it is reasonable to continue SAPT with OAC (the latter he takes for a history of PE)  - Continue with Plavix    2. Asymptomatic stenosis of right carotid artery  - Endovascular or surgery intervention are neither grade I nor IIa recommendations for asymptomatic carotid artery stenosis; thus, the usefulness/effectiveness of intervention is unknown/unclear/uncertain or not well established (however, there is a strong recommendation for intervention for >70% stenosis on US only in symptomatic patients with TIA or CVA; the patient does not fit into this category)   - History of statin intolerance and hesitancy to take any clinically beneficial antilipid therapy in general (PCSK9 inhibitors and bempedoic acid discussed last clinic appointment)  - Currently on antithrombotic therapy    3. Hyperlipidemia, unspecified hyperlipidemia type  - History of statin intolerance and hesitancy to take any clinically beneficial antilipid therapy in general (PCSK9 inhibitors and bempedoic acid discussed last clinic appointment)    4. Hypertension, unspecified type  - Well-controlled  - Continue with amlodipine and Coreg  - Currently on losartan    5. Chronic dyspnea  - Occurring for years without an unexplained, significant change in clinical status  - TTE in March 2024 noted a normal EF  - See \"CAD in native artery\" above     6. Thoracic aortic aneurysm without rupture, unspecified part (HCC)   - 3.9 cm ascending aorta noted on TTE in March 2024 though \"the aorta is normal caliber\" noted on CT chest in March 2024  - Sent a message to triage to have the radiologist addend the CTA report from March 19 with the measurements of the

## 2024-04-25 ENCOUNTER — OFFICE VISIT (OUTPATIENT)
Age: 69
End: 2024-04-25
Payer: MEDICARE

## 2024-04-25 ENCOUNTER — TELEPHONE (OUTPATIENT)
Age: 69
End: 2024-04-25

## 2024-04-25 VITALS
HEIGHT: 70 IN | HEART RATE: 57 BPM | DIASTOLIC BLOOD PRESSURE: 70 MMHG | WEIGHT: 282.4 LBS | BODY MASS INDEX: 40.43 KG/M2 | SYSTOLIC BLOOD PRESSURE: 128 MMHG

## 2024-04-25 DIAGNOSIS — R06.09 CHRONIC DYSPNEA: ICD-10-CM

## 2024-04-25 DIAGNOSIS — I71.20 THORACIC AORTIC ANEURYSM WITHOUT RUPTURE, UNSPECIFIED PART (HCC): ICD-10-CM

## 2024-04-25 DIAGNOSIS — E78.5 HYPERLIPIDEMIA, UNSPECIFIED HYPERLIPIDEMIA TYPE: ICD-10-CM

## 2024-04-25 DIAGNOSIS — I65.21 ASYMPTOMATIC STENOSIS OF RIGHT CAROTID ARTERY: ICD-10-CM

## 2024-04-25 DIAGNOSIS — I10 HYPERTENSION, UNSPECIFIED TYPE: ICD-10-CM

## 2024-04-25 DIAGNOSIS — I25.10 CAD IN NATIVE ARTERY: Primary | ICD-10-CM

## 2024-04-25 PROCEDURE — G8427 DOCREV CUR MEDS BY ELIG CLIN: HCPCS | Performed by: INTERNAL MEDICINE

## 2024-04-25 PROCEDURE — 1036F TOBACCO NON-USER: CPT | Performed by: INTERNAL MEDICINE

## 2024-04-25 PROCEDURE — 1123F ACP DISCUSS/DSCN MKR DOCD: CPT | Performed by: INTERNAL MEDICINE

## 2024-04-25 PROCEDURE — 3074F SYST BP LT 130 MM HG: CPT | Performed by: INTERNAL MEDICINE

## 2024-04-25 PROCEDURE — 3078F DIAST BP <80 MM HG: CPT | Performed by: INTERNAL MEDICINE

## 2024-04-25 PROCEDURE — 99214 OFFICE O/P EST MOD 30 MIN: CPT | Performed by: INTERNAL MEDICINE

## 2024-04-25 PROCEDURE — 3017F COLORECTAL CA SCREEN DOC REV: CPT | Performed by: INTERNAL MEDICINE

## 2024-04-25 PROCEDURE — G8417 CALC BMI ABV UP PARAM F/U: HCPCS | Performed by: INTERNAL MEDICINE

## 2024-04-25 RX ORDER — ISOSORBIDE MONONITRATE 30 MG/1
30 TABLET, EXTENDED RELEASE ORAL DAILY
Qty: 90 TABLET | Refills: 3 | Status: SHIPPED | OUTPATIENT
Start: 2024-04-25

## 2024-04-25 NOTE — TELEPHONE ENCOUNTER
----- Message from Augusto Foreman MD sent at 4/25/2024 12:14 PM EDT -----  Please reach out to José Rice MD to addend the CT chest from March 19 with the measurements of the aorta.  The patient is noted to have a dilated aorta on recent TTE, so I would like to know the measurements specifically from the CTA in order to plan on surveillance imaging for the patient moving forward.  I use CTA for surveillance imaging.      I spoke w/CT dept.[facility] and was given Buster Arriaga's contact info.I spoke w/Buster Lemus and he said he would take care of getting the addendum done.

## 2024-05-02 ENCOUNTER — OFFICE VISIT (OUTPATIENT)
Dept: INTERNAL MEDICINE CLINIC | Facility: CLINIC | Age: 69
End: 2024-05-02

## 2024-05-02 VITALS
OXYGEN SATURATION: 97 % | SYSTOLIC BLOOD PRESSURE: 140 MMHG | WEIGHT: 285 LBS | HEIGHT: 70 IN | HEART RATE: 55 BPM | BODY MASS INDEX: 40.8 KG/M2 | DIASTOLIC BLOOD PRESSURE: 74 MMHG

## 2024-05-02 DIAGNOSIS — Z78.9 STATIN INTOLERANCE: ICD-10-CM

## 2024-05-02 DIAGNOSIS — I10 PRIMARY HYPERTENSION: ICD-10-CM

## 2024-05-02 DIAGNOSIS — E11.69 HYPERLIPIDEMIA ASSOCIATED WITH TYPE 2 DIABETES MELLITUS (HCC): ICD-10-CM

## 2024-05-02 DIAGNOSIS — N18.31 CHRONIC KIDNEY DISEASE, STAGE 3A (HCC): Primary | ICD-10-CM

## 2024-05-02 DIAGNOSIS — E78.5 HYPERLIPIDEMIA ASSOCIATED WITH TYPE 2 DIABETES MELLITUS (HCC): ICD-10-CM

## 2024-05-02 DIAGNOSIS — E11.22 TYPE 2 DIABETES MELLITUS WITH STAGE 3 CHRONIC KIDNEY DISEASE, WITHOUT LONG-TERM CURRENT USE OF INSULIN, UNSPECIFIED WHETHER STAGE 3A OR 3B CKD (HCC): ICD-10-CM

## 2024-05-02 DIAGNOSIS — I82.409 RECURRENT DEEP VENOUS THROMBOSIS (HCC): ICD-10-CM

## 2024-05-02 DIAGNOSIS — N18.30 TYPE 2 DIABETES MELLITUS WITH STAGE 3 CHRONIC KIDNEY DISEASE, WITHOUT LONG-TERM CURRENT USE OF INSULIN, UNSPECIFIED WHETHER STAGE 3A OR 3B CKD (HCC): ICD-10-CM

## 2024-05-02 RX ORDER — CHLORTHALIDONE 25 MG/1
25 TABLET ORAL DAILY
Qty: 90 TABLET | Refills: 0 | Status: SHIPPED | OUTPATIENT
Start: 2024-05-02

## 2024-05-02 SDOH — ECONOMIC STABILITY: FOOD INSECURITY: WITHIN THE PAST 12 MONTHS, THE FOOD YOU BOUGHT JUST DIDN'T LAST AND YOU DIDN'T HAVE MONEY TO GET MORE.: NEVER TRUE

## 2024-05-02 SDOH — ECONOMIC STABILITY: HOUSING INSECURITY
IN THE LAST 12 MONTHS, WAS THERE A TIME WHEN YOU DID NOT HAVE A STEADY PLACE TO SLEEP OR SLEPT IN A SHELTER (INCLUDING NOW)?: NO

## 2024-05-02 SDOH — ECONOMIC STABILITY: FOOD INSECURITY: WITHIN THE PAST 12 MONTHS, YOU WORRIED THAT YOUR FOOD WOULD RUN OUT BEFORE YOU GOT MONEY TO BUY MORE.: NEVER TRUE

## 2024-05-02 SDOH — ECONOMIC STABILITY: INCOME INSECURITY: HOW HARD IS IT FOR YOU TO PAY FOR THE VERY BASICS LIKE FOOD, HOUSING, MEDICAL CARE, AND HEATING?: NOT HARD AT ALL

## 2024-05-02 ASSESSMENT — ENCOUNTER SYMPTOMS
RECTAL PAIN: 0
VOICE CHANGE: 0
CHOKING: 0
STRIDOR: 0
EYE PAIN: 0

## 2024-05-02 NOTE — PROGRESS NOTES
FOLLOWUP VISIT    Subjective:    Mr. Bland is a 68 y.o., male,   Chief Complaint   Patient presents with    Follow-up       HPI:    Patient presents today for follow up of two or more chronic medical problems and review of labs.       The patient has hypertension.  The patient has been on an attempted low sodium diet and has been trying to exercise and maintain a healthy weight.  The patient reports good compliance with the blood pressure medications.       The patient has hyperlipidemia.  The patient has been following a low cholesterol diet.  He has been statin intolerant.      The patient has coronary artery disease.  The patient has been attempting to follow a heart healthy diet and exercise.  The patient denies chest pain, shortness of breath, dyspnea on exertion, or PND.       The patient has diabetes mellitus.  The patient denies any symptoms of hypo or hyperglycemia.  The patient has been attempting to be compliant with an ADA diet and an exercise program.     The following portions of the patient's history were reviewed and updated as appropriate:      Past Medical History:   Diagnosis Date    Coronary artery disease     S/P MI / stents.    Diabetes mellitus, type 2 (HCC)     Hyperlipidemia associated with type 2 diabetes mellitus (HCC)     Hypertension, essential     Morbid obesity (HCC)     Statin intolerance        Past Surgical History:   Procedure Laterality Date    CARDIAC PROCEDURE N/A 12/13/2023    Left heart cath / coronary angiography performed by Brennan Cam MD at [facility] CARDIAC CATH LAB    CARDIAC PROCEDURE N/A 12/13/2023    Intravascular ultrasound performed by Brennan Cam MD at [facility] CARDIAC CATH LAB    CARDIAC PROCEDURE N/A 12/13/2023    Percutaneous coronary intervention performed by Brennan Cam MD at [facility] CARDIAC CATH LAB    CARDIAC PROCEDURE N/A 12/27/2023    Percutaneous coronary intervention performed by Brennan Cam MD at [facility] CARDIAC CATH LAB    CARDIAC PROCEDURE N/A

## 2024-05-03 ENCOUNTER — TELEPHONE (OUTPATIENT)
Dept: INTERVENTIONAL RADIOLOGY/VASCULAR | Age: 69
End: 2024-05-03

## 2024-05-03 NOTE — TELEPHONE ENCOUNTER
Dr. Borges reached out to patient today to obtain verbal consent for patient to be contacted by our hospital PA team.  He had a very positive outcome from a pulmonary embolus thrombectomy performed on March 19.  Patient's case could be used for educating providers as well as the community about the treatment of pulmonary emboli as well as educating the public as to what interventional radiology can offer and procedures our department performs.  Patient was in agreement to being interviewed in the near future.

## 2024-05-24 ENCOUNTER — TELEPHONE (OUTPATIENT)
Age: 69
End: 2024-05-24

## 2024-05-24 NOTE — TELEPHONE ENCOUNTER
Per Angelia at , advised patient Eliquis samples are waiting at  . Patient verbalized understanding. He requests Eliquis 5 mg BID be sent \to [facility]. Advised patient to call [facility] at [phone] to set up account and give code \"[id number]\" for 5% off first order. Advised patient that I will ask Dr. Grey to sign Eliquis order and send to [facility]. Patient verbalized understanding.   Requested Prescriptions     Signed Prescriptions Disp Refills    apixaban (ELIQUIS) 5 MG TABS tablet 180 tablet 3     Sig: Take 1 tablet by mouth 2 times daily     Authorizing Provider: ARNULFO GREY     Ordering User: KIMBERLY FINLEY     Eliquis Rx, as above, placed on Dr. Grey's desk to be signed.

## 2024-05-24 NOTE — TELEPHONE ENCOUNTER
Wants to get samples of Eliquis in [place] or [place]   And wants the instructions on how to order from [facility] Please call

## 2024-07-08 RX ORDER — AMLODIPINE BESYLATE 10 MG/1
10 TABLET ORAL DAILY
Qty: 90 TABLET | Refills: 3 | Status: SHIPPED | OUTPATIENT
Start: 2024-07-08

## 2024-07-08 RX ORDER — NITROGLYCERIN 0.4 MG/1
0.4 TABLET SUBLINGUAL EVERY 5 MIN PRN
Qty: 100 TABLET | Refills: 3 | Status: SHIPPED | OUTPATIENT
Start: 2024-07-08

## 2024-07-08 NOTE — TELEPHONE ENCOUNTER
Meds continued lov 4/2/5/24 refill pended for approval as below:  Requested Prescriptions     Pending Prescriptions Disp Refills    nitroGLYCERIN (NITROSTAT) 0.4 MG SL tablet 100 tablet 3     Sig: Place 1 tablet under the tongue every 5 minutes as needed for Chest pain (take one tablet by mouth under tongue every 5 min x 3 as neede for chest pain.If no relief by 3rd dose call 911.)    amLODIPine (NORVASC) 10 MG tablet 90 tablet 3     Sig: Take 1 tablet by mouth daily

## 2024-07-08 NOTE — TELEPHONE ENCOUNTER
MEDICATION REFILL REQUEST      Name of Medication:  Amlodipine  Dose:  10 mg  Frequency:  QD  Quantity:  90  Days' supply:  90 with 3 refills      Pharmacy Name/Location:  [phone]      MEDICATION REFILL REQUEST      Name of Medication:  Nitroglycerin  Dose:  0.4 mg  Frequency:  as needed  Quantity:  ?  Days' supply:  ?      Pharmacy Name/Location:  [phone]

## 2024-07-09 ENCOUNTER — TELEPHONE (OUTPATIENT)
Age: 69
End: 2024-07-09

## 2024-07-09 NOTE — TELEPHONE ENCOUNTER
Patient washed his Nitro and amLODIpine  and needs refill sent to [address] / 90 day supply. Leaving out of town on Thursday.

## 2024-08-23 NOTE — PROGRESS NOTES
08/23/24 0829   Wound 06/14/24 Buttocks Mid # 1   Date First Assessed/Time First Assessed: 06/14/24 0826   Present on Original Admission: Yes  Wound Approximate Age at First Assessment (Weeks): (c) 100 weeks  Location: Buttocks  Wound Location Orientation: Mid  Wound Description (Comments): # 1   Wound Image    Wound Etiology Other  (masd)   Dressing Status Intact   Wound Cleansed Vashe   Dressing/Treatment Xeroform   Wound Length (cm) 5 cm   Wound Width (cm) 0.3 cm   Wound Depth (cm) 0.1 cm   Wound Surface Area (cm^2) 1.5 cm^2   Change in Wound Size % (l*w) 90.91   Wound Volume (cm^3) 0.15 cm^3   Wound Healing % 91   Wound Assessment Pink/red;Epithelialization   Drainage Amount Small (< 25%)   Drainage Description Serosanguinous   Odor None   Alley-wound Assessment Intact   Wound Thickness Description not for Pressure Injury Full thickness        Pt presented with worsening angina via clinic for routine LHC. LHC showed extensive stenting of every major epicardial coronary artery and diffuse ISR. [place] the stenting was so extensive the meaningful bypass would be difficult (no non-stented targets). POBA of undersided stents in LAD and Circ were performed today in addition to ostial LAD and Circ stent placement, will need work done or residual RCA stenosis. We stopped after two vessels to reduce contrast exposure. Plan on repeat cath with work on RCA in 1-2 weeks after discharge.     Glenis Villalpando MD

## 2024-09-04 RX ORDER — CARVEDILOL 3.12 MG/1
3.12 TABLET ORAL DAILY
Qty: 180 TABLET | Refills: 1 | Status: SHIPPED | OUTPATIENT
Start: 2024-09-04

## 2024-09-04 RX ORDER — CLOPIDOGREL BISULFATE 75 MG/1
75 TABLET ORAL DAILY
Qty: 90 TABLET | Refills: 3 | Status: SHIPPED | OUTPATIENT
Start: 2024-09-04

## 2024-09-04 RX ORDER — LOSARTAN POTASSIUM 100 MG/1
100 TABLET ORAL DAILY
Qty: 90 TABLET | Refills: 3 | Status: SHIPPED | OUTPATIENT
Start: 2024-09-04

## 2024-09-04 RX ORDER — AMLODIPINE BESYLATE 10 MG/1
10 TABLET ORAL DAILY
Qty: 90 TABLET | Refills: 3 | Status: SHIPPED | OUTPATIENT
Start: 2024-09-04

## 2024-09-04 NOTE — TELEPHONE ENCOUNTER
Requested Prescriptions     Pending Prescriptions Disp Refills    amLODIPine (NORVASC) 10 MG tablet 90 tablet 3     Sig: Take 1 tablet by mouth daily    carvedilol (COREG) 3.125 MG tablet 180 tablet 1     Sig: Take 1 tablet by mouth daily    clopidogrel (PLAVIX) 75 MG tablet 90 tablet 3     Sig: Take 1 tablet by mouth daily    losartan (COZAAR) 100 MG tablet 90 tablet 3     Sig: Take 1 tablet by mouth daily     Patient wants to know if Dr. May could send refills to [facility]. Patient is scheduled for follow up visit.

## 2024-10-28 NOTE — PROGRESS NOTES
[facility] CARDIOLOGY Follow Up                 Reason for Visit: CCD    Subjective:     Patient is a 69 y.o. male with a PMH of CAD status post PCI, asymptomatic right ICA stenosis, hypertension, hyperlipidemia, recurrent DVT, PE, CKD stage III and statin intolerance who presents for follow-up.  The patient was last seen in April 2024.  Imdur 30 mg daily was started for angina prevention.  It was noted that patient carries a high thrombotic risk with multiple coronary stents, with a history of statin intolerance, and hesitancy to start PCSK9 inhibitors and bempedoic acid.  Thus, it was noted to be reasonable to continue SAPT with OAC (the latter he takes for a history of PE).  The patient had a CTA in March 2024 that noted \"aorta is normal in caliber\".  He had a TTE in March 2024 that was noted to demonstrate a normal EF. The patient denies angina and dyspnea.    Past Medical History:   Diagnosis Date    Coronary artery disease     S/P MI / stents.    Diabetes mellitus, type 2 (HCC)     Hyperlipidemia associated with type 2 diabetes mellitus (HCC)     Hypertension, essential     Morbid obesity     Statin intolerance       Past Surgical History:   Procedure Laterality Date    CARDIAC PROCEDURE N/A 12/13/2023    Left heart cath / coronary angiography performed by Brennan Cam MD at [facility] CARDIAC CATH LAB    CARDIAC PROCEDURE N/A 12/13/2023    Intravascular ultrasound performed by Brennan Cam MD at [facility] CARDIAC CATH LAB    CARDIAC PROCEDURE N/A 12/13/2023    Percutaneous coronary intervention performed by Brennan Cam MD at [facility] CARDIAC CATH LAB    CARDIAC PROCEDURE N/A 12/27/2023    Percutaneous coronary intervention performed by Brennan Cam MD at [facility] CARDIAC CATH LAB    CARDIAC PROCEDURE N/A 12/27/2023    Intravascular ultrasound performed by Brennan Cam MD at [facility] CARDIAC CATH LAB    CARDIAC PROCEDURE N/A 12/27/2023    Left heart cath / coronary angiography performed by Brennan Cam MD at [facility]

## 2024-10-30 ENCOUNTER — OFFICE VISIT (OUTPATIENT)
Age: 69
End: 2024-10-30

## 2024-10-30 VITALS
WEIGHT: 288 LBS | DIASTOLIC BLOOD PRESSURE: 82 MMHG | HEART RATE: 54 BPM | BODY MASS INDEX: 41.23 KG/M2 | HEIGHT: 70 IN | SYSTOLIC BLOOD PRESSURE: 162 MMHG

## 2024-10-30 DIAGNOSIS — I10 HYPERTENSION, UNSPECIFIED TYPE: ICD-10-CM

## 2024-10-30 DIAGNOSIS — I25.10 CAD IN NATIVE ARTERY: Primary | ICD-10-CM

## 2024-10-30 DIAGNOSIS — E78.5 HYPERLIPIDEMIA, UNSPECIFIED HYPERLIPIDEMIA TYPE: ICD-10-CM

## 2024-10-30 DIAGNOSIS — I65.21 ASYMPTOMATIC STENOSIS OF RIGHT CAROTID ARTERY: ICD-10-CM

## 2024-10-30 RX ORDER — EZETIMIBE 10 MG/1
10 TABLET ORAL NIGHTLY
Qty: 90 TABLET | Refills: 3 | Status: SHIPPED | OUTPATIENT
Start: 2024-10-30

## 2024-10-30 RX ORDER — NITROGLYCERIN 0.4 MG/1
0.4 TABLET SUBLINGUAL EVERY 5 MIN PRN
Qty: 100 TABLET | Refills: 3 | Status: SHIPPED | OUTPATIENT
Start: 2024-10-30

## 2024-10-30 RX ORDER — ISOSORBIDE MONONITRATE 30 MG/1
30 TABLET, EXTENDED RELEASE ORAL DAILY
Qty: 90 TABLET | Refills: 3 | Status: SHIPPED | OUTPATIENT
Start: 2024-10-30

## 2025-04-06 NOTE — PROGRESS NOTES
SUBJECTIVE:                                                    Oscar Gonsalez is a 61 year old male who presents to clinic today for the following health issues:      History of Present Illness   Hypertension:     Outpatient blood pressures:  Are being checked    Blood pressures checked at:  Work    Dietary sodium intake::  Low salt diet    Having some headaches lately. Haven't taken meds in a few days.       Problem list and histories reviewed & adjusted, as indicated.  Additional history: as documented        Patient Active Problem List   Diagnosis     Essential hypertension     CAD (coronary artery disease)     HTN, goal below 140/90     Primary hypercoagulable state (H)     Asymptomatic varicose veins     Advanced directives, counseling/discussion     Hyperlipidemia with target LDL less than 100     Morbid obesity (H)     Past Surgical History:   Procedure Laterality Date     HC REPAIR ROTATOR CUFF,ACUTE       STENT      [facility]     STENT, CORONARY, ODELL  2009       Social History   Substance Use Topics     Smoking status: Never Smoker     Smokeless tobacco: Never Used     Alcohol use Yes      Comment: rare     Family History   Problem Relation Age of Onset     CANCER Mother      HEART DISEASE Father       from hearrt atck     Asthma No family hx of      C.A.D. No family hx of      CEREBROVASCULAR DISEASE No family hx of      Hypertension No family hx of      DIABETES No family hx of      Breast Cancer No family hx of      Cancer - colorectal No family hx of      Prostate Cancer No family hx of      Alcohol/Drug No family hx of      Allergies No family hx of      Alzheimer Disease No family hx of      Anesthesia Reaction No family hx of      Arthritis No family hx of      Blood Disease No family hx of      Cardiovascular No family hx of      Circulatory No family hx of      Congenital Anomalies No family hx of      Connective Tissue Disorder No family hx of      Depression No family hx  Seizure type activity likely due to anoxia  EEG negative on early admit   Repeat EEG 4/11 showing non-specific encephalopathy, no seizure activity seen    MRI brain neg for any ischemia  Seizure and fall precautions   of      Endocrine Disease No family hx of      Eye Disorder No family hx of      Genetic Disorder No family hx of      GASTROINTESTINAL DISEASE No family hx of      Genitourinary Problems No family hx of      Gynecology No family hx of      Lipids No family hx of      Musculoskeletal Disorder No family hx of      Neurologic Disorder No family hx of      Obesity No family hx of      OSTEOPOROSIS No family hx of      Respiratory No family hx of      Family History Negative No family hx of      Psychotic Disorder No family hx of      Thyroid Disease No family hx of      Hearing Loss No family hx of          Current Outpatient Prescriptions   Medication Sig Dispense Refill     losartan (COZAAR) 100 MG tablet Take 1 tablet (100 mg) by mouth daily 90 tablet 1     hydrochlorothiazide (MICROZIDE) 12.5 MG capsule Take 2 capsules (25 mg) by mouth every morning 90 capsule 1     aspirin 81 MG tablet Take 1 tablet (81 mg) by mouth daily 90 tablet 3     FISH OIL 1000 MG PO CAPS 3000mg daily (liquid formalation)  11     MULTIVITAMINS PO TABS 1 TABLET DAILY       [DISCONTINUED] hydrochlorothiazide (MICROZIDE) 12.5 MG capsule Take 1 capsule (12.5 mg) by mouth daily (Patient not taking: Reported on 6/6/2017) 90 capsule 1     [DISCONTINUED] losartan (COZAAR) 100 MG tablet Take 1 tablet (100 mg) by mouth daily 90 tablet 1     No Known Allergies  BP Readings from Last 3 Encounters:   06/06/17 (!) 170/100   10/19/16 (!) 160/98   03/11/16 130/90    Wt Readings from Last 3 Encounters:   06/06/17 (!) 304 lb (137.9 kg)   10/19/16 288 lb (130.6 kg)   03/11/16 266 lb (120.7 kg)                  Labs reviewed in EPIC    ROS:  Constitutional, HEENT, cardiovascular, pulmonary, gi and gu systems are negative, except as otherwise noted.    OBJECTIVE:                                                    BP (!) 170/100 (BP Location: Right arm, Patient Position: Chair, Cuff Size: Adult Large)  Pulse 62  Temp 97.8  F (36.6  C) (Oral)  Resp 18  Ht 5'  "11.5\" (1.816 m)  Wt (!) 304 lb (137.9 kg)  BMI 41.81 kg/m2  Body mass index is 41.81 kg/(m^2).  Physical Exam   Constitutional: He is oriented to person, place, and time. He appears well-developed and well-nourished.   HENT:   Head: Normocephalic and atraumatic.   Neck: Neck supple.   Cardiovascular: Normal rate, regular rhythm and normal heart sounds.  Exam reveals no gallop and no friction rub.    No murmur heard.  Pulmonary/Chest: Effort normal and breath sounds normal.   Musculoskeletal: He exhibits edema.   Neurological: He is alert and oriented to person, place, and time.   Psychiatric: He has a normal mood and affect.         Diagnostic Test Results:  none      ASSESSMENT/PLAN:                                                      Problem List Items Addressed This Visit     Essential hypertension     Reviewed labs from outside.  Has been out of meds for 3 days   Restart meds  Return to pharmacy for blood pressure recheck in 1 week  Encouraged weight loss         Relevant Medications    losartan (COZAAR) 100 MG tablet      Other Visit Diagnoses     Benign essential hypertension        Relevant Medications    losartan (COZAAR) 100 MG tablet    hydrochlorothiazide (MICROZIDE) 12.5 MG capsule           BMI:   Estimated body mass index is 41.81 kg/(m^2) as calculated from the following:    Height as of this encounter: 5' 11.5\" (1.816 m).    Weight as of this encounter: 304 lb (137.9 kg).   Weight management plan: Discussed healthy diet and exercise guidelines and patient will follow up in 6 months in clinic to re-evaluate.      See Patient Instructions    Annita Macdonald MD  [facility]  "

## 2025-04-28 NOTE — PROGRESS NOTES
[facility] CARDIOLOGY Follow Up                 Reason for Visit: CCD    Subjective:     Patient is a 69 y.o. male with a PMH of CAD status post PCI, asymptomatic right ICA stenosis, hypertension, hyperlipidemia, recurrent DVT, PE, CKD stage III and statin intolerance who presents for follow-up.  The patient was last seen in October 2024.  He had a TTE in March 2024 that was noted to demonstrate a normal EF. The patient denies angina and dyspnea.  He reports that his BP's are in the 150s over 80s at home.    Past Medical History:   Diagnosis Date    Coronary artery disease     S/P MI / stents.    Diabetes mellitus, type 2 (HCC)     Hyperlipidemia associated with type 2 diabetes mellitus (HCC)     Hypertension, essential     Morbid obesity ([facility])     Statin intolerance       Past Surgical History:   Procedure Laterality Date    CARDIAC PROCEDURE N/A 12/13/2023    Left heart cath / coronary angiography performed by Brennan Cam MD at [facility] CARDIAC CATH LAB    CARDIAC PROCEDURE N/A 12/13/2023    Intravascular ultrasound performed by Brennan Cam MD at [facility] CARDIAC CATH LAB    CARDIAC PROCEDURE N/A 12/13/2023    Percutaneous coronary intervention performed by Brennan Cam MD at [facility] CARDIAC CATH LAB    CARDIAC PROCEDURE N/A 12/27/2023    Percutaneous coronary intervention performed by Brennan Cam MD at [facility] CARDIAC CATH LAB    CARDIAC PROCEDURE N/A 12/27/2023    Intravascular ultrasound performed by Brennan Cam MD at [facility] CARDIAC CATH LAB    CARDIAC PROCEDURE N/A 12/27/2023    Left heart cath / coronary angiography performed by Brennan Cam MD at [facility] CARDIAC CATH LAB    IR GUIDED THROMB [facility] VEIN  3/19/2024    IR THROMB [facility] VEIN 3/19/2024 [facility] RADIOLOGY SPECIALS    SHOULDER SURGERY Left     labral tear / rotator cuff      Family History   Problem Relation Age of Onset    Cervical Cancer Mother     Heart Attack Father       Social History     Tobacco Use    Smoking status: Never     Passive exposure:  none

## 2025-04-30 ENCOUNTER — OFFICE VISIT (OUTPATIENT)
Age: 70
End: 2025-04-30
Payer: MEDICARE

## 2025-04-30 VITALS
BODY MASS INDEX: 40.86 KG/M2 | HEART RATE: 64 BPM | WEIGHT: 285.4 LBS | SYSTOLIC BLOOD PRESSURE: 150 MMHG | HEIGHT: 70 IN | DIASTOLIC BLOOD PRESSURE: 84 MMHG

## 2025-04-30 DIAGNOSIS — I10 HYPERTENSION, UNSPECIFIED TYPE: ICD-10-CM

## 2025-04-30 DIAGNOSIS — I87.2 CHRONIC VENOUS INSUFFICIENCY: ICD-10-CM

## 2025-04-30 DIAGNOSIS — I65.21 ASYMPTOMATIC STENOSIS OF RIGHT CAROTID ARTERY: ICD-10-CM

## 2025-04-30 DIAGNOSIS — E78.5 HYPERLIPIDEMIA, UNSPECIFIED HYPERLIPIDEMIA TYPE: ICD-10-CM

## 2025-04-30 DIAGNOSIS — I25.10 CAD IN NATIVE ARTERY: Primary | ICD-10-CM

## 2025-04-30 PROBLEM — E66.813 OBESITY, CLASS 3 (HCC): Status: ACTIVE | Noted: 2025-04-30

## 2025-04-30 PROCEDURE — 3079F DIAST BP 80-89 MM HG: CPT | Performed by: INTERNAL MEDICINE

## 2025-04-30 PROCEDURE — 1036F TOBACCO NON-USER: CPT | Performed by: INTERNAL MEDICINE

## 2025-04-30 PROCEDURE — 99214 OFFICE O/P EST MOD 30 MIN: CPT | Performed by: INTERNAL MEDICINE

## 2025-04-30 PROCEDURE — 3077F SYST BP >= 140 MM HG: CPT | Performed by: INTERNAL MEDICINE

## 2025-04-30 PROCEDURE — 1126F AMNT PAIN NOTED NONE PRSNT: CPT | Performed by: INTERNAL MEDICINE

## 2025-04-30 PROCEDURE — 3017F COLORECTAL CA SCREEN DOC REV: CPT | Performed by: INTERNAL MEDICINE

## 2025-04-30 PROCEDURE — 1123F ACP DISCUSS/DSCN MKR DOCD: CPT | Performed by: INTERNAL MEDICINE

## 2025-04-30 PROCEDURE — G8428 CUR MEDS NOT DOCUMENT: HCPCS | Performed by: INTERNAL MEDICINE

## 2025-04-30 PROCEDURE — G8417 CALC BMI ABV UP PARAM F/U: HCPCS | Performed by: INTERNAL MEDICINE

## 2025-04-30 RX ORDER — DOXAZOSIN 2 MG/1
2 TABLET ORAL DAILY
Qty: 90 TABLET | Refills: 3 | Status: SHIPPED | OUTPATIENT
Start: 2025-04-30

## 2025-06-12 ENCOUNTER — RESULTS FOLLOW-UP (OUTPATIENT)
Age: 70
End: 2025-06-12

## 2025-06-12 DIAGNOSIS — N28.9 RENAL INSUFFICIENCY: Primary | ICD-10-CM

## 2025-06-12 DIAGNOSIS — I25.10 CAD IN NATIVE ARTERY: ICD-10-CM

## 2025-06-12 LAB
ANION GAP SERPL CALC-SCNC: 11 MMOL/L (ref 7–16)
BUN SERPL-MCNC: 16 MG/DL (ref 8–23)
CALCIUM SERPL-MCNC: 9.3 MG/DL (ref 8.8–10.2)
CHLORIDE SERPL-SCNC: 103 MMOL/L (ref 98–107)
CO2 SERPL-SCNC: 25 MMOL/L (ref 20–29)
CREAT SERPL-MCNC: 1.69 MG/DL (ref 0.8–1.3)
ERYTHROCYTE [DISTWIDTH] IN BLOOD BY AUTOMATED COUNT: 13.2 % (ref 11.9–14.6)
GLUCOSE SERPL-MCNC: 145 MG/DL (ref 70–99)
HCT VFR BLD AUTO: 40.1 % (ref 41.1–50.3)
HGB BLD-MCNC: 13.7 G/DL (ref 13.6–17.2)
MAGNESIUM SERPL-MCNC: 2.4 MG/DL (ref 1.8–2.4)
MCH RBC QN AUTO: 29 PG (ref 26.1–32.9)
MCHC RBC AUTO-ENTMCNC: 34.2 G/DL (ref 31.4–35)
MCV RBC AUTO: 85 FL (ref 82–102)
NRBC # BLD: 0 K/UL (ref 0–0.2)
PLATELET # BLD AUTO: 230 K/UL (ref 150–450)
PMV BLD AUTO: 9.9 FL (ref 9.4–12.3)
POTASSIUM SERPL-SCNC: 4.4 MMOL/L (ref 3.5–5.1)
RBC # BLD AUTO: 4.72 M/UL (ref 4.23–5.6)
SODIUM SERPL-SCNC: 139 MMOL/L (ref 136–145)
WBC # BLD AUTO: 8.5 K/UL (ref 4.3–11.1)

## 2025-06-12 RX ORDER — DOXAZOSIN 4 MG/1
4 TABLET ORAL DAILY
Qty: 90 TABLET | Refills: 3 | Status: SHIPPED | OUTPATIENT
Start: 2025-06-12

## 2025-06-13 NOTE — TELEPHONE ENCOUNTER
Done  ----- Message from Dr. Augusto Foreman MD sent at 6/13/2025  9:52 AM EDT -----  He will need to see a nephrologist.  Please provide him the phone number to nephrology for him to call them to get scheduled.  I placed a referral.  ----- Message -----  From: Alcira Del Toro LPN  Sent: 6/13/2025   9:22 AM EDT  To: Augusto Foreman MD

## 2025-06-13 NOTE — TELEPHONE ENCOUNTER
Patient called with Dr Foreman's response. Patient voiced understanding. He is no longer taking chlorthalidone. //brendab    Please let the patient know his Hgb is normal.  He has worsening renal function in the setting of chlorthalidone use.  Thus, I stopped chlorthalidone and increased doxazosin to 4 mg daily.     ----- Message from Dr. Augusto Foreman MD sent at 6/12/2025  6:26 PM EDT -----  If patient is nonadherent to chlorthalidone let me know.  It looks like Dr. May last filled it in such a way that he would no longer have enough pills to take it regularly daily.  I ordered a nephrology consult too.

## (undated) DEVICE — GLIDESHEATH SLENDER STAINLESS STEEL KIT: Brand: GLIDESHEATH SLENDER

## (undated) DEVICE — CATH BLLN ANGIO 4X12MM NC EUPHORIA RX

## (undated) DEVICE — HI-TORQUE BALANCE MIDDLEWEIGHT UNIVERSAL GUIDE WIRE .014 J TIP 3.0 CM X 190 CM: Brand: HI-TORQUE BALANCE MIDDLEWEIGHT UNIVERSAL

## (undated) DEVICE — CATH BLLN ANGIO 4X20MM NC EUPHORIA RX

## (undated) DEVICE — CORONARY IMAGING CATHETER: Brand: OPTICROSS™ HD

## (undated) DEVICE — GUIDEWIRE VASC L260CM DIA0.035IN RAD 3MM J TIP L7CM PTFE

## (undated) DEVICE — DISPOSABLE PULLBACK SLED FOR MOTORDRIVE

## (undated) DEVICE — PTCA DILATATION CATHETER: Brand: NC EMERGE®

## (undated) DEVICE — CATHETER COR DIAG JUDKINS R 5.0 CRV 6FR 100CM 0 SIDE H

## (undated) DEVICE — CATHETER COR DIAG JUDKINS L 3.5 CRV 6FR 100CM 0 SIDE H

## (undated) DEVICE — DEVICE COMPR LNG 27 CM VASC BND

## (undated) DEVICE — CATH BLLN ANGIO 2.75X20MM NC EUPHORIA RX

## (undated) DEVICE — CATH LITHOPLSTY 4X12MM SHOCKWAVE

## (undated) DEVICE — CATH BLLN ANGIO 3.50X15MM NC EUPHORA RX

## (undated) DEVICE — CATHETER GUID EXTRA BACKUP 3.5 0.070IN 6FR 100CM VISTA BRITE TIP

## (undated) DEVICE — HI-TORQUE BALANCE MIDDLEWEIGHT GUIDE WIRE .014 J TIP 3.0 CM X 190 CM: Brand: HI-TORQUE BALANCE MIDDLEWEIGHT

## (undated) DEVICE — CATHETER GUID 6FR L100CM GRN PTFE JR4 TRUELUMEN HYBRID

## (undated) DEVICE — HEMOSTASIS VALVE PHD SM BORE WITH SPRING

## (undated) DEVICE — CATH BLLN ANGIO 3.50X20MM NC EUPHORIA RX

## (undated) DEVICE — Device: Brand: PROWATER